# Patient Record
Sex: FEMALE | Race: WHITE | NOT HISPANIC OR LATINO | Employment: FULL TIME | URBAN - METROPOLITAN AREA
[De-identification: names, ages, dates, MRNs, and addresses within clinical notes are randomized per-mention and may not be internally consistent; named-entity substitution may affect disease eponyms.]

---

## 2023-10-25 ENCOUNTER — APPOINTMENT (OUTPATIENT)
Dept: PRIMARY CARE | Facility: CLINIC | Age: 28
End: 2023-10-25
Payer: COMMERCIAL

## 2023-12-05 ENCOUNTER — OFFICE VISIT (OUTPATIENT)
Dept: SURGERY | Facility: CLINIC | Age: 28
End: 2023-12-05
Payer: COMMERCIAL

## 2023-12-05 VITALS
BODY MASS INDEX: 22.56 KG/M2 | HEIGHT: 62 IN | SYSTOLIC BLOOD PRESSURE: 133 MMHG | WEIGHT: 122.6 LBS | HEART RATE: 67 BPM | DIASTOLIC BLOOD PRESSURE: 87 MMHG | OXYGEN SATURATION: 99 %

## 2023-12-05 DIAGNOSIS — D24.2 FIBROADENOMA OF LEFT BREAST: Primary | ICD-10-CM

## 2023-12-05 PROCEDURE — 99213 OFFICE O/P EST LOW 20 MIN: CPT | Performed by: SURGERY

## 2023-12-05 PROCEDURE — 1036F TOBACCO NON-USER: CPT | Performed by: SURGERY

## 2023-12-05 RX ORDER — SODIUM FLUORIDE1.1%, POTASSIUM NITRATE 5% 5.8; 57.5 MG/ML; MG/ML
GEL, DENTIFRICE DENTAL
COMMUNITY
Start: 2023-10-18

## 2023-12-05 RX ORDER — NORGESTIMATE AND ETHINYL ESTRADIOL 0.25-0.035
1 KIT ORAL DAILY
COMMUNITY
Start: 2023-07-17 | End: 2023-12-28

## 2023-12-05 NOTE — PATIENT INSTRUCTIONS
1.  3 for removal of the 2 left breast lumps is scheduled for Monday, 12/18/2023.  Please, read the patient preoperative instruction sheet BEFORE your surgery.  Outpatient surgery will not require a Covid test. However, if you will be admitted to the hospital after your surgery, you will need a Covid test within 72 hours of your procedure.  2.  The ultrasound of the breast and follow-up appointment in the office in 3 months will be canceled since her masses will be removed at the time of surgery.

## 2023-12-05 NOTE — PROGRESS NOTES
GENERAL SURGERY OFFICE NOTE    Patient: Princess Gutierrez    Age: 28 y.o.   Gender: female    MRN: [id number]    PCP: No primary care provider on file.        SUBJECTIVE     Chief Complaint  Follow-up (Patient is here for a left breast follow up. Patient states that she has a newer lump in the left breast that has gotten bigger. Patient states that she had some tenderness and redness in the left armpit that lasted 2-3 days. 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 In the last 6 months, she does not feel that the fibroadenoma has significantly changed. She has some mild discomfort, but does not have any significant pain associated with the fibroadenomas.  She does feel that the smaller of the fibroadenomas has become larger and easier to palpate.  She is ready to consider surgical removal of both of these left breast masses.    Risk factors for breast cancer: 27-year-old premenopausal white female; menarche at age 13; no children; had a right core needle biopsy at age 14 with benign diagnosis; no first-degree relative with breast cancer. She has a maternal grandmother who had breast cancer in her late 30s. She had a paternal uncle who had prostate cancer. She did take birth control pills for about 14 years. Alanis score not applicable due to age.     ROS  Review of Systems   Constitutional: no fever, no chills, no recent weight gain and no recent weight loss.   Eyes: no eye symptoms, but no loss of vision, no discharge from the eyes, no itching of the eyes and no eye pain.   ENT: no hearing loss, no neck pain and no hoarseness.   Cardiovascular: no chest pain, no palpitations and no lower extremity edema.   Respiratory: no dyspnea, no dyspnea during exertion and no cough.   Breast: breast mass and pain in breast, but no nipple discharge, no erythema, no change in breast skin and no axillary adenopathy.   Gastrointestinal: no abdominal pain, no constipation, no heartburn, no  "vomiting, no blood in stools, bowel movement frequency normal.   Genitourinary: no dysuria and no hematuria.   Musculoskeletal: no arthralgias, no myalgias and no hernia.   Integumentary: no rashes and no skin lesions.   Neurological: no headache, no dizziness, no numbness, no tingling and no limb weakness.   Psychiatric: no anxiety, no depression and no emotional problems.   Endocrine: no heat or cold intolerance and no increased thirst.   Hematologic/Lymphatic: no tendency for easy bleeding and no tendency for easy bruising.   All other systems have been reviewed and are negative for complaint.     HISTORY   History reviewed. No pertinent past medical history.     Past Surgical History:   Procedure Laterality Date    BREAST BIOPSY          No Known Allergies     Social History     Tobacco Use   Smoking Status Never   Smokeless Tobacco Never        Social History     Substance and Sexual Activity   Alcohol Use Yes    Alcohol/week: 1.0 standard drink of alcohol    Types: 1 Glasses of wine per week        HOME MEDICATIONS  Current Outpatient Medications   Medication Instructions    Evangelina 0.25-35 mg-mcg tablet 1 tablet, oral, Daily    sodium fluoride-pot nitrate 1.1-5 % paste USE TWICE A DAY MORNING AND EVENING.          OBJECTIVE   Last Recorded Vitals.  Blood pressure 133/87, pulse 67, height 1.575 m (5' 2\"), weight 55.6 kg (122 lb 9.6 oz), SpO2 99 %.     PHYSICAL EXAM  Physical Exam   General: Well-developed, well-nourished and in no acute distress.  Head: Normocephalic. Atraumatic.  Neck/thyroid: Neck is supple.   Eyes: Pupils equal round and reactive to light. Conjunctiva normal.  ENMT: No masses or deformity of external nose. External ears without masses.  Respiratory/Chest: Normal respiratory effort.  Breast: Small, symmetrical breasts. No palpable abnormalities of the right breast. In the left breast, there is an area measuring 4.5 x 3 cm in the deep breast tissue which is firmer than the surrounding tissue. " This is in the periareolar and superior quadrants. No erythema of the skin. No erythema. Just below and attached to the left nipple there is a skin abnormality that measures less than one by one 1 cm which is nontender and not erythematous. The previous 1.7 cm mass at the 12 o'clock position approximately 3 cm from the nipple is more easier to palpate and distinguish from the surrounding fibrocystic tissue than previously.  No expressible fluid or overlying erythema.  Lymphatics: No palpable lymphadenopathy of the cervical, supraclavicular or axillary regions.  Cardiovascular: Regular rate and rhythm.   Abdomen: Soft, nontender, nondistended.   Musculoskeletal: Joints and limbs are grossly normal. Normal gait. Normal range of motion of major joints.  Neuro: Oriented to person, place and time. No obvious neurological deficit. Motor strength grossly normal.  Psych: Normal mood and affect.     RESULTS   Labs  No results found for this or any previous visit (from the past 24 hour(s)).    Radiology Resutls  BREAST ULTRASOUND; US ELASTROGRAPHY FIRST TARGET LESION; US  ELASTROGRAPHY EA ADD TARGET LESION;  9/5/2023 8:55 am     ACCESSION NUMBER(S):  [id number]; [id number]; [id number]     ORDERING CLINICIAN:  CARA WALTON     INDICATION:  Follow-up palpable abnormality left breast .     COMPARISON:  Ultrasound examination 02/24/2023     FINDINGS:  ULTRASOUND: Targeted ultrasound was performed of the left breast by a  registered sonographer with elastography. Heterogeneous breast tissue  present left breast 10 o'clock position 2 cm from nipple with a  HydroMARK reflecting the prior site of biopsy. This area appears  similar to prior examination. This was biopsy proven fibroadenoma.     Adjacent to the biopsy site is a solid 17 mm lobulated mass 12  o'clock position 3 cm from nipple likely reflecting a fibroadenoma  albeit not identified on prior ultrasound examination. Continued  surveillance is recommended. If there is any  future change in size or  clinical symptoms, biopsy is warranted.     IMPRESSION:  Stable heterogeneous breast tissue at the biopsy site. Newly  visualized mass of the right breast having sonographic  characteristics favoring a fibroadenoma particularly in this patient  with prior biopsy proven fibroadenoma. Continued surveillance is  recommended.     BI-RADS CATEGORY:     BI-RADS category 3 - Probably Benign.     Recommendation: Six-month follow-up left breast with ultrasound  beginning date 09/05/2023      ASSESSMENT / PLAN       Plan  Feb 2023: LEFT: 4.6cm biopsy-proven fibroadenoma at the 10 o'clock position; second 1.7 cm left breast mass at the 12 o'clock position    1. With her dense breast tissue, it was discussed the importance of the self breast examination.  2. She has a biopsy-proven fibroadenoma measuring 4.6 cm in the left breast. Follow-up ultrasound shows this to be stable at 4.5 cm.  She has elected to proceed with surgical excision.  Can make a periareolar incision to remove this mass.  We discussed risk of breast size discrepancy postoperatively which she is well aware of.  3. She has a left breast mass measuring 1.7 cm which is consistent with the biopsy-proven fibroadenoma. This has benign features.  She feels that this mass is growing, and certainly is easier to palpate on today's physical exam.  She wishes to have this mass removed at the time of her surgery, and understands that this may be a separate incision from the periareolar incision.  4.  We will cancel the follow-up ultrasound in 2 months since both masses will be removed at the time of surgery.  5.  Risk included, but not limited to, infection, bleeding, injury to surrounding tissue, possible need for other procedure, nonresolution of all symptoms, recurrence of the masses, possible need for further surgery, allergic reaction to medication and even death.  Will plan to proceed with surgery on 12/18/2023.      Rosemarie Vidal MD,  [facility] General Surgery  [address] [place] [address];   [address]  [place]  [address] [phone]

## 2023-12-13 ENCOUNTER — ANESTHESIA EVENT (OUTPATIENT)
Dept: OPERATING ROOM | Facility: HOSPITAL | Age: 28
End: 2023-12-13
Payer: COMMERCIAL

## 2023-12-15 ENCOUNTER — PREP FOR PROCEDURE (OUTPATIENT)
Dept: EMERGENCY MEDICINE | Facility: HOSPITAL | Age: 28
End: 2023-12-15
Payer: COMMERCIAL

## 2023-12-15 RX ORDER — CEFAZOLIN SODIUM 2 G/100ML
2 INJECTION, SOLUTION INTRAVENOUS ONCE
Status: CANCELLED | OUTPATIENT
Start: 2023-12-15 | End: 2023-12-15

## 2023-12-15 NOTE — H&P (VIEW-ONLY)
History Of Present Illness  Princess Gutierrez is a 28 y.o. female presenting for excision of left breast mass x 2.  In the last 6 months, she does not feel that the fibroadenoma has significantly changed. She has some mild discomfort, but does not have any significant pain associated with the fibroadenomas.  She does feel that the smaller of the fibroadenomas has become larger and easier to palpate.  She is ready to consider surgical removal of both of these left breast masses.     Risk factors for breast cancer: 27-year-old premenopausal white female; menarche at age 13; no children; had a right core needle biopsy at age 14 with benign diagnosis; no first-degree relative with breast cancer. She has a maternal grandmother who had breast cancer in her late 30s. She had a paternal uncle who had prostate cancer. She did take birth control pills for about 14 years. Alanis score not applicable due to age.      Past Medical History  No past medical history on file.    Surgical History  Past Surgical History:   Procedure Laterality Date    BREAST BIOPSY          Social History  She reports that she has never smoked. She has never used smokeless tobacco. She reports current alcohol use of about 1.0 standard drink of alcohol per week. She reports that she does not use drugs.    Family History  No family history on file.     Allergies  Patient has no known allergies.    Review of Systems   Constitutional: no fever, no chills, no recent weight gain and no recent weight loss.   Eyes: no eye symptoms, but no loss of vision, no discharge from the eyes, no itching of the eyes and no eye pain.   ENT: no hearing loss, no neck pain and no hoarseness.   Cardiovascular: no chest pain, no palpitations and no lower extremity edema.   Respiratory: no dyspnea, no dyspnea during exertion and no cough.   Breast: breast mass and pain in breast, but no nipple discharge, no erythema, no change in breast skin and no axillary adenopathy.    Gastrointestinal: no abdominal pain, no constipation, no heartburn, no vomiting, no blood in stools, bowel movement frequency normal.   Genitourinary: no dysuria and no hematuria.   Musculoskeletal: no arthralgias, no myalgias and no hernia.   Integumentary: no rashes and no skin lesions.   Neurological: no headache, no dizziness, no numbness, no tingling and no limb weakness.   Psychiatric: no anxiety, no depression and no emotional problems.   Endocrine: no heat or cold intolerance and no increased thirst.   Hematologic/Lymphatic: no tendency for easy bleeding and no tendency for easy bruising.   All other systems have been reviewed and are negative for complaint.     Physical Exam   General: Well-developed, well-nourished and in no acute distress.  Head: Normocephalic. Atraumatic.  Neck/thyroid: Neck is supple.   Eyes: Pupils equal round and reactive to light. Conjunctiva normal.  ENMT: No masses or deformity of external nose. External ears without masses.  Respiratory/Chest: Normal respiratory effort.  Breast: Small, symmetrical breasts. No palpable abnormalities of the right breast. In the left breast, there is an area measuring 4.5 x 3 cm in the deep breast tissue which is firmer than the surrounding tissue. This is in the periareolar and superior quadrants. No erythema of the skin. No erythema. Just below and attached to the left nipple there is a skin abnormality that measures less than one by one 1 cm which is nontender and not erythematous. The previous 1.7 cm mass at the 12 o'clock position approximately 3 cm from the nipple is more easier to palpate and distinguish from the surrounding fibrocystic tissue than previously.  No expressible fluid or overlying erythema.  Lymphatics: No palpable lymphadenopathy of the cervical, supraclavicular or axillary regions.  Cardiovascular: Regular rate and rhythm.   Abdomen: Soft, nontender, nondistended.   Musculoskeletal: Joints and limbs are grossly normal. Normal  gait. Normal range of motion of major joints.  Neuro: Oriented to person, place and time. No obvious neurological deficit. Motor strength grossly normal.  Psych: Normal mood and affect.     Last Recorded Vitals  There were no vitals taken for this visit.       RESULTS   Labs  No results found for this or any previous visit (from the past 24 hour(s)).     Radiology Resutls  BREAST ULTRASOUND; US ELASTROGRAPHY FIRST TARGET LESION; US  ELASTROGRAPHY EA ADD TARGET LESION;  9/5/2023 8:55 am     ACCESSION NUMBER(S):  [id number]; [id number]; [id number]     ORDERING CLINICIAN:  CARA WALTON     INDICATION:  Follow-up palpable abnormality left breast .     COMPARISON:  Ultrasound examination 02/24/2023     FINDINGS:  ULTRASOUND: Targeted ultrasound was performed of the left breast by a  registered sonographer with elastography. Heterogeneous breast tissue  present left breast 10 o'clock position 2 cm from nipple with a  HydroMARK reflecting the prior site of biopsy. This area appears  similar to prior examination. This was biopsy proven fibroadenoma.     Adjacent to the biopsy site is a solid 17 mm lobulated mass 12  o'clock position 3 cm from nipple likely reflecting a fibroadenoma  albeit not identified on prior ultrasound examination. Continued  surveillance is recommended. If there is any future change in size or  clinical symptoms, biopsy is warranted.     IMPRESSION:  Stable heterogeneous breast tissue at the biopsy site. Newly  visualized mass of the right breast having sonographic  characteristics favoring a fibroadenoma particularly in this patient  with prior biopsy proven fibroadenoma. Continued surveillance is  recommended.     BI-RADS CATEGORY:     BI-RADS category 3 - Probably Benign.     Recommendation: Six-month follow-up left breast with ultrasound  beginning date 09/05/2023     Assessment/Plan   There are no diagnoses linked to this encounter.  Feb 2023: LEFT: 4.6cm biopsy-proven fibroadenoma at the 10  o'clock position; second 1.7 cm left breast mass at the 12 o'clock position     1. With her dense breast tissue, it was discussed the importance of the self breast examination.  2. She has a biopsy-proven fibroadenoma measuring 4.6 cm in the left breast. Follow-up ultrasound shows this to be stable at 4.5 cm.  She has elected to proceed with surgical excision.  Can make a periareolar incision to remove this mass.  We discussed risk of breast size discrepancy postoperatively which she is well aware of.  3. She has a left breast mass measuring 1.7 cm which is consistent with the biopsy-proven fibroadenoma. This has benign features.  She feels that this mass is growing, and certainly is easier to palpate on today's physical exam.  She wishes to have this mass removed at the time of her surgery, and understands that this may be a separate incision from the periareolar incision.  4.  We will cancel the follow-up ultrasound in 2 months since both masses will be removed at the time of surgery.  5.  Risk included, but not limited to, infection, bleeding, injury to surrounding tissue, possible need for other procedure, nonresolution of all symptoms, recurrence of the masses, possible need for further surgery, allergic reaction to medication and even death.  Will plan to proceed with surgery on 12/18/2023.      Rosemarie Vidal MD

## 2023-12-15 NOTE — H&P
History Of Present Illness  Princess Gutierrez is a 28 y.o. female presenting for excision of left breast mass x 2.  In the last 6 months, she does not feel that the fibroadenoma has significantly changed. She has some mild discomfort, but does not have any significant pain associated with the fibroadenomas.  She does feel that the smaller of the fibroadenomas has become larger and easier to palpate.  She is ready to consider surgical removal of both of these left breast masses.     Risk factors for breast cancer: 27-year-old premenopausal white female; menarche at age 13; no children; had a right core needle biopsy at age 14 with benign diagnosis; no first-degree relative with breast cancer. She has a maternal grandmother who had breast cancer in her late 30s. She had a paternal uncle who had prostate cancer. She did take birth control pills for about 14 years. Alanis score not applicable due to age.      Past Medical History  No past medical history on file.    Surgical History  Past Surgical History:   Procedure Laterality Date    BREAST BIOPSY          Social History  She reports that she has never smoked. She has never used smokeless tobacco. She reports current alcohol use of about 1.0 standard drink of alcohol per week. She reports that she does not use drugs.    Family History  No family history on file.     Allergies  Patient has no known allergies.    Review of Systems   Constitutional: no fever, no chills, no recent weight gain and no recent weight loss.   Eyes: no eye symptoms, but no loss of vision, no discharge from the eyes, no itching of the eyes and no eye pain.   ENT: no hearing loss, no neck pain and no hoarseness.   Cardiovascular: no chest pain, no palpitations and no lower extremity edema.   Respiratory: no dyspnea, no dyspnea during exertion and no cough.   Breast: breast mass and pain in breast, but no nipple discharge, no erythema, no change in breast skin and no axillary adenopathy.    Gastrointestinal: no abdominal pain, no constipation, no heartburn, no vomiting, no blood in stools, bowel movement frequency normal.   Genitourinary: no dysuria and no hematuria.   Musculoskeletal: no arthralgias, no myalgias and no hernia.   Integumentary: no rashes and no skin lesions.   Neurological: no headache, no dizziness, no numbness, no tingling and no limb weakness.   Psychiatric: no anxiety, no depression and no emotional problems.   Endocrine: no heat or cold intolerance and no increased thirst.   Hematologic/Lymphatic: no tendency for easy bleeding and no tendency for easy bruising.   All other systems have been reviewed and are negative for complaint.     Physical Exam   General: Well-developed, well-nourished and in no acute distress.  Head: Normocephalic. Atraumatic.  Neck/thyroid: Neck is supple.   Eyes: Pupils equal round and reactive to light. Conjunctiva normal.  ENMT: No masses or deformity of external nose. External ears without masses.  Respiratory/Chest: Normal respiratory effort.  Breast: Small, symmetrical breasts. No palpable abnormalities of the right breast. In the left breast, there is an area measuring 4.5 x 3 cm in the deep breast tissue which is firmer than the surrounding tissue. This is in the periareolar and superior quadrants. No erythema of the skin. No erythema. Just below and attached to the left nipple there is a skin abnormality that measures less than one by one 1 cm which is nontender and not erythematous. The previous 1.7 cm mass at the 12 o'clock position approximately 3 cm from the nipple is more easier to palpate and distinguish from the surrounding fibrocystic tissue than previously.  No expressible fluid or overlying erythema.  Lymphatics: No palpable lymphadenopathy of the cervical, supraclavicular or axillary regions.  Cardiovascular: Regular rate and rhythm.   Abdomen: Soft, nontender, nondistended.   Musculoskeletal: Joints and limbs are grossly normal. Normal  gait. Normal range of motion of major joints.  Neuro: Oriented to person, place and time. No obvious neurological deficit. Motor strength grossly normal.  Psych: Normal mood and affect.     Last Recorded Vitals  There were no vitals taken for this visit.       RESULTS   Labs  No results found for this or any previous visit (from the past 24 hour(s)).     Radiology Resutls  BREAST ULTRASOUND; US ELASTROGRAPHY FIRST TARGET LESION; US  ELASTROGRAPHY EA ADD TARGET LESION;  9/5/2023 8:55 am     ACCESSION NUMBER(S):  [id number]; [id number]; [id number]     ORDERING CLINICIAN:  CARA WALTON     INDICATION:  Follow-up palpable abnormality left breast .     COMPARISON:  Ultrasound examination 02/24/2023     FINDINGS:  ULTRASOUND: Targeted ultrasound was performed of the left breast by a  registered sonographer with elastography. Heterogeneous breast tissue  present left breast 10 o'clock position 2 cm from nipple with a  HydroMARK reflecting the prior site of biopsy. This area appears  similar to prior examination. This was biopsy proven fibroadenoma.     Adjacent to the biopsy site is a solid 17 mm lobulated mass 12  o'clock position 3 cm from nipple likely reflecting a fibroadenoma  albeit not identified on prior ultrasound examination. Continued  surveillance is recommended. If there is any future change in size or  clinical symptoms, biopsy is warranted.     IMPRESSION:  Stable heterogeneous breast tissue at the biopsy site. Newly  visualized mass of the right breast having sonographic  characteristics favoring a fibroadenoma particularly in this patient  with prior biopsy proven fibroadenoma. Continued surveillance is  recommended.     BI-RADS CATEGORY:     BI-RADS category 3 - Probably Benign.     Recommendation: Six-month follow-up left breast with ultrasound  beginning date 09/05/2023     Assessment/Plan   There are no diagnoses linked to this encounter.  Feb 2023: LEFT: 4.6cm biopsy-proven fibroadenoma at the 10  o'clock position; second 1.7 cm left breast mass at the 12 o'clock position     1. With her dense breast tissue, it was discussed the importance of the self breast examination.  2. She has a biopsy-proven fibroadenoma measuring 4.6 cm in the left breast. Follow-up ultrasound shows this to be stable at 4.5 cm.  She has elected to proceed with surgical excision.  Can make a periareolar incision to remove this mass.  We discussed risk of breast size discrepancy postoperatively which she is well aware of.  3. She has a left breast mass measuring 1.7 cm which is consistent with the biopsy-proven fibroadenoma. This has benign features.  She feels that this mass is growing, and certainly is easier to palpate on today's physical exam.  She wishes to have this mass removed at the time of her surgery, and understands that this may be a separate incision from the periareolar incision.  4.  We will cancel the follow-up ultrasound in 2 months since both masses will be removed at the time of surgery.  5.  Risk included, but not limited to, infection, bleeding, injury to surrounding tissue, possible need for other procedure, nonresolution of all symptoms, recurrence of the masses, possible need for further surgery, allergic reaction to medication and even death.  Will plan to proceed with surgery on 12/18/2023.      Rosemarie Vidal MD

## 2023-12-18 ENCOUNTER — ANESTHESIA (OUTPATIENT)
Dept: OPERATING ROOM | Facility: HOSPITAL | Age: 28
End: 2023-12-18
Payer: COMMERCIAL

## 2023-12-18 ENCOUNTER — HOSPITAL ENCOUNTER (OUTPATIENT)
Facility: HOSPITAL | Age: 28
Setting detail: OUTPATIENT SURGERY
Discharge: HOME | End: 2023-12-18
Attending: SURGERY | Admitting: SURGERY
Payer: COMMERCIAL

## 2023-12-18 ENCOUNTER — PHARMACY VISIT (OUTPATIENT)
Dept: PHARMACY | Facility: CLINIC | Age: 28
End: 2023-12-18
Payer: COMMERCIAL

## 2023-12-18 VITALS
DIASTOLIC BLOOD PRESSURE: 89 MMHG | HEART RATE: 72 BPM | OXYGEN SATURATION: 100 % | HEIGHT: 62 IN | BODY MASS INDEX: 22.45 KG/M2 | TEMPERATURE: 98.1 F | SYSTOLIC BLOOD PRESSURE: 126 MMHG | RESPIRATION RATE: 18 BRPM | WEIGHT: 122 LBS

## 2023-12-18 DIAGNOSIS — D24.2 FIBROADENOMA OF LEFT BREAST: Primary | ICD-10-CM

## 2023-12-18 DIAGNOSIS — D24.2 BENIGN NEOPLASM OF LEFT BREAST: ICD-10-CM

## 2023-12-18 PROCEDURE — 2720000007 HC OR 272 NO HCPCS: Performed by: SURGERY

## 2023-12-18 PROCEDURE — 7100000010 HC PHASE TWO TIME - EACH INCREMENTAL 1 MINUTE: Performed by: SURGERY

## 2023-12-18 PROCEDURE — 2500000004 HC RX 250 GENERAL PHARMACY W/ HCPCS (ALT 636 FOR OP/ED): Performed by: LICENSED PRACTICAL NURSE

## 2023-12-18 PROCEDURE — 2500000005 HC RX 250 GENERAL PHARMACY W/O HCPCS: Performed by: LICENSED PRACTICAL NURSE

## 2023-12-18 PROCEDURE — 7100000002 HC RECOVERY ROOM TIME - EACH INCREMENTAL 1 MINUTE: Performed by: SURGERY

## 2023-12-18 PROCEDURE — 3600000003 HC OR TIME - INITIAL BASE CHARGE - PROCEDURE LEVEL THREE: Performed by: SURGERY

## 2023-12-18 PROCEDURE — 2500000004 HC RX 250 GENERAL PHARMACY W/ HCPCS (ALT 636 FOR OP/ED): Performed by: ANESTHESIOLOGY

## 2023-12-18 PROCEDURE — 88305 TISSUE EXAM BY PATHOLOGIST: CPT | Performed by: PATHOLOGY

## 2023-12-18 PROCEDURE — 88305 TISSUE EXAM BY PATHOLOGIST: CPT | Mod: TC,SUR,PORLAB | Performed by: SURGERY

## 2023-12-18 PROCEDURE — 3600000008 HC OR TIME - EACH INCREMENTAL 1 MINUTE - PROCEDURE LEVEL THREE: Performed by: SURGERY

## 2023-12-18 PROCEDURE — 3700000002 HC GENERAL ANESTHESIA TIME - EACH INCREMENTAL 1 MINUTE: Performed by: SURGERY

## 2023-12-18 PROCEDURE — RXMED WILLOW AMBULATORY MEDICATION CHARGE

## 2023-12-18 PROCEDURE — 3700000001 HC GENERAL ANESTHESIA TIME - INITIAL BASE CHARGE: Performed by: SURGERY

## 2023-12-18 PROCEDURE — 7100000009 HC PHASE TWO TIME - INITIAL BASE CHARGE: Performed by: SURGERY

## 2023-12-18 PROCEDURE — 2500000005 HC RX 250 GENERAL PHARMACY W/O HCPCS: Performed by: SURGERY

## 2023-12-18 PROCEDURE — 7100000001 HC RECOVERY ROOM TIME - INITIAL BASE CHARGE: Performed by: SURGERY

## 2023-12-18 PROCEDURE — 2500000004 HC RX 250 GENERAL PHARMACY W/ HCPCS (ALT 636 FOR OP/ED): Performed by: SURGERY

## 2023-12-18 RX ORDER — HYDRALAZINE HYDROCHLORIDE 20 MG/ML
5 INJECTION INTRAMUSCULAR; INTRAVENOUS EVERY 30 MIN PRN
Status: DISCONTINUED | OUTPATIENT
Start: 2023-12-18 | End: 2023-12-18 | Stop reason: HOSPADM

## 2023-12-18 RX ORDER — SODIUM CHLORIDE, SODIUM LACTATE, POTASSIUM CHLORIDE, CALCIUM CHLORIDE 600; 310; 30; 20 MG/100ML; MG/100ML; MG/100ML; MG/100ML
100 INJECTION, SOLUTION INTRAVENOUS CONTINUOUS
Status: DISCONTINUED | OUTPATIENT
Start: 2023-12-18 | End: 2023-12-18 | Stop reason: HOSPADM

## 2023-12-18 RX ORDER — BUPIVACAINE HCL/EPINEPHRINE 0.5-1:200K
VIAL (ML) INJECTION AS NEEDED
Status: DISCONTINUED | OUTPATIENT
Start: 2023-12-18 | End: 2023-12-18 | Stop reason: HOSPADM

## 2023-12-18 RX ORDER — OXYCODONE AND ACETAMINOPHEN 5; 325 MG/1; MG/1
1 TABLET ORAL EVERY 4 HOURS PRN
Status: DISCONTINUED | OUTPATIENT
Start: 2023-12-18 | End: 2023-12-18 | Stop reason: HOSPADM

## 2023-12-18 RX ORDER — LIDOCAINE HYDROCHLORIDE 10 MG/ML
0.1 INJECTION, SOLUTION EPIDURAL; INFILTRATION; INTRACAUDAL; PERINEURAL ONCE
Status: DISCONTINUED | OUTPATIENT
Start: 2023-12-18 | End: 2023-12-18 | Stop reason: HOSPADM

## 2023-12-18 RX ORDER — FAMOTIDINE 10 MG/ML
20 INJECTION INTRAVENOUS ONCE
Status: COMPLETED | OUTPATIENT
Start: 2023-12-18 | End: 2023-12-18

## 2023-12-18 RX ORDER — LIDOCAINE HCL/PF 100 MG/5ML
SYRINGE (ML) INTRAVENOUS AS NEEDED
Status: DISCONTINUED | OUTPATIENT
Start: 2023-12-18 | End: 2023-12-18

## 2023-12-18 RX ORDER — MORPHINE SULFATE 2 MG/ML
2 INJECTION, SOLUTION INTRAMUSCULAR; INTRAVENOUS EVERY 5 MIN PRN
Status: DISCONTINUED | OUTPATIENT
Start: 2023-12-18 | End: 2023-12-18 | Stop reason: HOSPADM

## 2023-12-18 RX ORDER — ONDANSETRON HYDROCHLORIDE 2 MG/ML
4 INJECTION, SOLUTION INTRAVENOUS ONCE AS NEEDED
Status: DISCONTINUED | OUTPATIENT
Start: 2023-12-18 | End: 2023-12-18 | Stop reason: HOSPADM

## 2023-12-18 RX ORDER — ALBUTEROL SULFATE 0.83 MG/ML
2.5 SOLUTION RESPIRATORY (INHALATION) ONCE AS NEEDED
Status: DISCONTINUED | OUTPATIENT
Start: 2023-12-18 | End: 2023-12-18 | Stop reason: HOSPADM

## 2023-12-18 RX ORDER — MIDAZOLAM HYDROCHLORIDE 1 MG/ML
INJECTION, SOLUTION INTRAMUSCULAR; INTRAVENOUS AS NEEDED
Status: DISCONTINUED | OUTPATIENT
Start: 2023-12-18 | End: 2023-12-18

## 2023-12-18 RX ORDER — FENTANYL CITRATE 50 UG/ML
INJECTION, SOLUTION INTRAMUSCULAR; INTRAVENOUS AS NEEDED
Status: DISCONTINUED | OUTPATIENT
Start: 2023-12-18 | End: 2023-12-18

## 2023-12-18 RX ORDER — PROPOFOL 10 MG/ML
INJECTION, EMULSION INTRAVENOUS AS NEEDED
Status: DISCONTINUED | OUTPATIENT
Start: 2023-12-18 | End: 2023-12-18

## 2023-12-18 RX ORDER — HYDROCODONE BITARTRATE AND ACETAMINOPHEN 7.5; 325 MG/1; MG/1
1 TABLET ORAL EVERY 6 HOURS PRN
Qty: 10 TABLET | Refills: 0 | Status: SHIPPED | OUTPATIENT
Start: 2023-12-18 | End: 2023-12-25

## 2023-12-18 RX ORDER — LABETALOL HYDROCHLORIDE 5 MG/ML
5 INJECTION, SOLUTION INTRAVENOUS ONCE AS NEEDED
Status: DISCONTINUED | OUTPATIENT
Start: 2023-12-18 | End: 2023-12-18 | Stop reason: HOSPADM

## 2023-12-18 RX ORDER — DROPERIDOL 2.5 MG/ML
0.62 INJECTION, SOLUTION INTRAMUSCULAR; INTRAVENOUS ONCE AS NEEDED
Status: DISCONTINUED | OUTPATIENT
Start: 2023-12-18 | End: 2023-12-18 | Stop reason: HOSPADM

## 2023-12-18 RX ORDER — DIPHENHYDRAMINE HYDROCHLORIDE 50 MG/ML
12.5 INJECTION INTRAMUSCULAR; INTRAVENOUS ONCE AS NEEDED
Status: DISCONTINUED | OUTPATIENT
Start: 2023-12-18 | End: 2023-12-18 | Stop reason: HOSPADM

## 2023-12-18 RX ORDER — DEXAMETHASONE SODIUM PHOSPHATE 4 MG/ML
INJECTION, SOLUTION INTRA-ARTICULAR; INTRALESIONAL; INTRAMUSCULAR; INTRAVENOUS; SOFT TISSUE AS NEEDED
Status: DISCONTINUED | OUTPATIENT
Start: 2023-12-18 | End: 2023-12-18

## 2023-12-18 RX ORDER — CEFAZOLIN SODIUM 2 G/100ML
2 INJECTION, SOLUTION INTRAVENOUS ONCE
Status: COMPLETED | OUTPATIENT
Start: 2023-12-18 | End: 2023-12-18

## 2023-12-18 RX ADMIN — CEFAZOLIN SODIUM 2 G: 2 INJECTION, SOLUTION INTRAVENOUS at 08:54

## 2023-12-18 RX ADMIN — FAMOTIDINE 20 MG: 10 INJECTION, SOLUTION INTRAVENOUS at 08:00

## 2023-12-18 RX ADMIN — PROPOFOL 200 MG: 10 INJECTION, EMULSION INTRAVENOUS at 09:00

## 2023-12-18 RX ADMIN — MIDAZOLAM HYDROCHLORIDE 2 MG: 1 INJECTION, SOLUTION INTRAMUSCULAR; INTRAVENOUS at 08:54

## 2023-12-18 RX ADMIN — FENTANYL CITRATE 50 MCG: 50 INJECTION, SOLUTION INTRAMUSCULAR; INTRAVENOUS at 09:00

## 2023-12-18 RX ADMIN — DEXAMETHASONE SODIUM PHOSPHATE 8 MG: 4 INJECTION INTRA-ARTICULAR; INTRALESIONAL; INTRAMUSCULAR; INTRAVENOUS; SOFT TISSUE at 09:03

## 2023-12-18 RX ADMIN — LIDOCAINE HYDROCHLORIDE 50 MG: 20 INJECTION INTRAVENOUS at 09:00

## 2023-12-18 RX ADMIN — SODIUM CHLORIDE, POTASSIUM CHLORIDE, SODIUM LACTATE AND CALCIUM CHLORIDE 100 ML/HR: 600; 310; 30; 20 INJECTION, SOLUTION INTRAVENOUS at 08:00

## 2023-12-18 SDOH — HEALTH STABILITY: MENTAL HEALTH: CURRENT SMOKER: 0

## 2023-12-18 ASSESSMENT — PAIN - FUNCTIONAL ASSESSMENT
PAIN_FUNCTIONAL_ASSESSMENT: 0-10
PAIN_FUNCTIONAL_ASSESSMENT: 0-10

## 2023-12-18 ASSESSMENT — PAIN SCALES - GENERAL
PAIN_LEVEL: 0
PAINLEVEL_OUTOF10: 0 - NO PAIN

## 2023-12-18 ASSESSMENT — COLUMBIA-SUICIDE SEVERITY RATING SCALE - C-SSRS
6. HAVE YOU EVER DONE ANYTHING, STARTED TO DO ANYTHING, OR PREPARED TO DO ANYTHING TO END YOUR LIFE?: NO
1. IN THE PAST MONTH, HAVE YOU WISHED YOU WERE DEAD OR WISHED YOU COULD GO TO SLEEP AND NOT WAKE UP?: NO

## 2023-12-18 NOTE — OP NOTE
Left breast lumpectomy x2 (L) Operative Note     Date: 2023  OR Location: [facility]    Name: Princess Gutierrez, : 1995, Age: 28 y.o., MRN: [id number], Sex: female    Diagnosis  * No Diagnosis Codes entered * * No Diagnosis Codes entered *     Procedures  Left breast lumpectomy x2  42615 - MT EXC CYST/ABERRANT BREAST TISSUE OPEN 1/> LESION      Surgeons      * Rosemarie Vidal - Primary    Resident/Fellow/Other Assistant:  Surgeon(s) and Role:    Procedure Summary  Anesthesia: General  ASA: I  Anesthesia Staff: CRNA: EMANI Delatorre-CRNA  Estimated Blood Loss: 5mL  Intra-op Medications:   Medication Name Total Dose   BUPivacaine-EPINEPHrine (Marcaine w/EPI) 0.5 %-1:200,000 injection 20 mL   ceFAZolin in dextrose (iso-os) (Ancef) IVPB 2 g 2 g              Anesthesia Record               Intraprocedure I/O Totals       None           Specimen:   ID Type Source Tests Collected by Time   1 : Left retroareolar mass Tissue BREAST LUMPECTOMY LEFT SURGICAL PATHOLOGY EXAM Rosemarie Vidal MD 2023 0940   2 : left upper outer mass Tissue BREAST LUMPECTOMY LEFT SURGICAL PATHOLOGY EXAM Rosemarie Vidal MD 2023 0948        Staff:   Circulator: Perla Arguello RN  Scrub Person: Princess Reeserodrigue         Drains and/or Catheters: * None in log *    Tourniquet Times:         Implants:     Findings: Significant fibrocystic changes of the breast.  Palpable mass x 2    Indications: Princess Gutierrez is an 28 y.o. female who is having surgery for D24.2.  She had presented originally with a large 4 cm mass of the left breast in the retroareolar region.  She had undergone a core needle biopsy which showed a fibroadenoma.  She then presented with a second palpable mass of the upper outer quadrant.  This had similar findings of the previous fibroadenoma.  These were monitored, but she felt like they were growing.  She elected to have them removed.  The benefits and risks of this were  reviewed with the patient preoperatively.  The masses were marked in the preop holding area and confirmed by the patient.    The patient was seen in the preoperative area. The risks, benefits, complications, treatment options, non-operative alternatives, expected recovery and outcomes were discussed with the patient. The possibilities of reaction to medication, pulmonary aspiration, injury to surrounding structures, bleeding, recurrent infection, the need for additional procedures, failure to diagnose a condition, and creating a complication requiring transfusion or operation were discussed with the patient. The patient concurred with the proposed plan, giving informed consent.  The site of surgery was properly noted/marked if necessary per policy. The patient has been actively warmed in preoperative area. Preoperative antibiotics have been ordered and given within 1 hours of incision. Venous thrombosis prophylaxis have been ordered including bilateral sequential compression devices    Procedure Details:   She was brought into the operating room and was laid in the supine position.  After placement under general anesthesia, CORRY hose and SCDs were placed on bilateral lower extremities.  The left breast then was prepped with ChloraPrep and draped in the usual sterile fashion.  A pause was taken the correct patient procedure were identified.    At this time half percent Marcaine with epinephrine was used to locally anesthetize the periareolar region on the superior aspect.  A 4.5 cm curvilinear incision was made just on the edge of the areola on the superior aspect.  This incision was carried down through the breast tissue which demonstrated significant fibrocystic disease.  The area of the palpable fibroadenoma was identified and the mass was grasped with Gregoria clamps.  Using mostly palpation to identify the edge of the fibroadenoma, the fibroadenoma was excised using cautery.  The initial mass measured at least 4 cm  in greatest diameter.  Once this mass was removed, the area of the secondary mass in the upper outer quadrant was identified through the incision.  This was grasped with a Gregoria clamp on the inside and excised in a similar fashion using palpation to guide the edge of the masses.  Once both masses were removed, the cavity was checked for hemostasis.  Was irrigated and there was no evidence of any bleeding.  The superficial breast tissue then was reapproximated using interrupted stitches of 2-0 Vicryl suture.  The skin was reapproximated using a running subcuticular stitch of 4-0 Vicryl suture.  The wound then was dressed with a combination of Steri-Strips, 4 x 4's and tape.  The patient tolerated the procedure well.  She then was awoken from general anesthesia and taken to the recovery room in stable condition.    Counts: Correct x 2  Complications: None.  Drains: None.    Complications:  None; patient tolerated the procedure well.    Disposition: PACU - hemodynamically stable.  Condition: stable         Additional Details:     Attending Attestation: I was present for the entire procedure.    Rosemarie Vidal  Phone Number: [phone]

## 2023-12-18 NOTE — DISCHARGE INSTRUCTIONS
1.  Do not engage in sports, heavy work or lifting until cleared by Dr. Vidal.  Do not lift more than 10 pounds for 2 weeks with left arm.  2.  You may shower starting the day after your surgery.  Remove the gauze/tape dressing before your shower.  Pat dry the Steri-Strips after your shower.  You do NOT need to cover the Steri-Strips after your shower unless there is bleeding or drainage.  3.  Allow the Steri-Strips to fall off on their own.  4.  You may drive when off of narcotic pain medication.  5.  Apply ice to your surgical area for 20 minutes 3 times a day to help with pain and swelling.  6.  You may use Tylenol, or ibuprofen, in addition to or instead of, your narcotic pain medication.  7.  Diet as tolerated.   8.  Wear your postoperative bra or a sports bra for the first 48 hours at all times.  Then for comfort only.  9.  Call Dr. Vidal's office ([phone]) if you experience any of the following:            Any evidence of infection at your surgical site which can include redness and drainage.            Excessive bleeding from your surgical site.  If there is a small amount of bleeding, apply pressure for 20 minutes.  If the bleeding does not stop, please call the office.            Some nausea and vomiting is expected for the first 24 hours after surgery.  If you are unable to keep down fluids, or the nausea/vomiting continues beyond 24 hours.             If you are unable to urinate within 8-12 hours after discharge from the hospital.            A low-grade fever after surgery is normal.  Notify the office if your temperature goes above 101.            Any other concerns or questions you have regarding your surgery.

## 2023-12-18 NOTE — ANESTHESIA PROCEDURE NOTES
Airway  Date/Time: 12/18/2023 9:00 AM  Urgency: elective    Airway not difficult    Staffing  Performed: resident   Authorized by: EMANI Delatorre-INGRID    Performed by: MEHDI Delatorre  Patient location during procedure: OR    Indications and Patient Condition  Indications for airway management: anesthesia  Spontaneous ventilation: present  Sedation level: deep  Preoxygenated: yes  Patient position: sniffing  Mask difficulty assessment: 0 - not attempted  No planned trial extubation    Final Airway Details  Final airway type: supraglottic airway      Successful airway: Supraglottic airway: i-gel.  Size 4     Number of attempts at approach: 1  Ventilation between attempts: BVM  Number of other approaches attempted: 0    Additional Comments  Inserted without difficulty by medical student, atraumatic, dentition intact.

## 2023-12-18 NOTE — ANESTHESIA POSTPROCEDURE EVALUATION
Patient: Princess Gutierrez    Procedure Summary       Date: 12/18/23 Room / Location: [facility] 01 / [facility]    Anesthesia Start: 0855 Anesthesia Stop: 1019    Procedure: Left breast lumpectomy x2 (Left) Diagnosis: (D24.2)    Surgeons: Rosemarie Vidal MD Responsible Provider: MEHDI Delatorre    Anesthesia Type: general ASA Status: 1            Anesthesia Type: general    Vitals Value Taken Time   /87 12/18/23 1018   Temp 36.5 °C (97.7 °F) 12/18/23 1018   Pulse 105 12/18/23 1018   Resp 16 12/18/23 1019   SpO2 100 % 12/18/23 1019   Vitals shown include unvalidated device data.    Anesthesia Post Evaluation    Patient participation: complete - patient participated  Level of consciousness: awake and alert  Pain score: 0  Pain management: adequate  Airway patency: patent  Cardiovascular status: acceptable  Respiratory status: acceptable  Hydration status: acceptable  Postoperative Nausea and Vomiting: none        There were no known notable events for this encounter.

## 2023-12-18 NOTE — ANESTHESIA PREPROCEDURE EVALUATION
Patient: Princess Gutierrez    Procedure Information       Date/Time: 12/18/23 0845    Procedure: Left breast lumpectomy x2 (Left) - 8 AM; 1 hr    Location: [facility] / [facility]    Surgeons: Rosemarie Vidal MD            Relevant Problems   No relevant active problems       Clinical information reviewed:   Tobacco  Allergies  Meds  Problems  Med Hx  Surg Hx  OB Status    Fam Hx  Soc Hx        NPO Detail:  NPO/Void Status  Date of Last Liquid: 12/17/23  Time of Last Liquid: 2000  Date of Last Solid: 12/17/23  Time of Last Solid: 2000         Physical Exam    Airway  Mallampati: I  TM distance: >3 FB     Cardiovascular - normal exam     Dental - normal exam     Pulmonary - normal exam     Abdominal - normal exam             Anesthesia Plan    ASA 1     general     The patient is not a current smoker.    intravenous induction   Postoperative administration of opioids is intended.  Anesthetic plan and risks discussed with patient.  Use of blood products discussed with patient who.    Plan discussed with CRNA.

## 2023-12-25 DIAGNOSIS — N92.1 EXCESSIVE AND FREQUENT MENSTRUATION WITH IRREGULAR CYCLE: ICD-10-CM

## 2023-12-26 LAB
LABORATORY COMMENT REPORT: NORMAL
PATH REPORT.FINAL DX SPEC: NORMAL
PATH REPORT.GROSS SPEC: NORMAL
PATH REPORT.RELEVANT HX SPEC: NORMAL
PATH REPORT.TOTAL CANCER: NORMAL

## 2023-12-27 ENCOUNTER — TELEPHONE (OUTPATIENT)
Dept: OBSTETRICS AND GYNECOLOGY | Facility: CLINIC | Age: 28
End: 2023-12-27
Payer: COMMERCIAL

## 2023-12-28 RX ORDER — NORGESTIMATE AND ETHINYL ESTRADIOL 0.25-0.035
1 KIT ORAL DAILY
Qty: 84 TABLET | Refills: 3 | Status: SHIPPED | OUTPATIENT
Start: 2023-12-28 | End: 2024-04-24 | Stop reason: SDUPTHER

## 2024-01-03 ENCOUNTER — OFFICE VISIT (OUTPATIENT)
Dept: SURGERY | Facility: CLINIC | Age: 29
End: 2024-01-03
Payer: COMMERCIAL

## 2024-01-03 VITALS
DIASTOLIC BLOOD PRESSURE: 69 MMHG | BODY MASS INDEX: 22.41 KG/M2 | OXYGEN SATURATION: 99 % | WEIGHT: 121.8 LBS | SYSTOLIC BLOOD PRESSURE: 105 MMHG | HEIGHT: 62 IN | HEART RATE: 73 BPM

## 2024-01-03 DIAGNOSIS — R92.30 DENSE BREAST TISSUE: ICD-10-CM

## 2024-01-03 DIAGNOSIS — D24.2 FIBROADENOMA OF LEFT BREAST: Primary | ICD-10-CM

## 2024-01-03 DIAGNOSIS — N60.19 FIBROCYSTIC BREAST DISEASE (FCBD), UNSPECIFIED LATERALITY: ICD-10-CM

## 2024-01-03 PROCEDURE — 1036F TOBACCO NON-USER: CPT | Performed by: SURGERY

## 2024-01-03 PROCEDURE — 99024 POSTOP FOLLOW-UP VISIT: CPT | Performed by: SURGERY

## 2024-01-03 NOTE — PATIENT INSTRUCTIONS
1.  You may use lotion on your incision site to help with any dryness or itching.  Using a vitamin E or cocoa butter based lotion is recommended to do a 5-minute massage therapy twice a day to your incision site to help it heal.  2.  Since you have had removal of both of the fibroadenomas, no follow-up x-ray or exam is needed at this time.  You should continue to do your monthly self breast exams, and if you identify any abnormalities, you may contact Dr. Vidal's office for reevaluation.  If the day after your surgery, call the office to make an appointment for 2 weeks.  If you have any questions, please contact our office at [phone].  3.  You should start your yearly screening mammograms at age 40.  You may get these through either your primary care physician or your gynecologist all

## 2024-01-03 NOTE — PROGRESS NOTES
GENERAL SURGERY OFFICE NOTE    Patient: Princess Gutierrez    Age: 28 y.o.   Gender: female    MRN: [id number]    PCP: No Assigned PCP Generic Provider, MD        SUBJECTIVE     Chief Complaint  Follow-up (Patient is here for a 2 week post op left breast lumpectomy. Patient states that she is healing well. )       LUANN Sánchez returns to the office for 2-week postop check after undergoing an excisional lumpectomy x 2 of the left breast.  At the time of the surgery, she was found to have fairly significant dense breast tissue.  The 2 breast masses were removed without difficulty.  She only took the pain medication for the first 2 days, but started to have some GI upset secondary to the pain medication.  She did put ice on the area which helped with the swelling.  She has not noticed any recent drainage or erythema.  Very minimal pain.  Eating and drinking well without any nausea or vomiting.  No fevers.    Risk factors for breast cancer: 28-year-old premenopausal white female; menarche at age 13; no children; had a right core needle biopsy at age 14 with benign diagnosis; no first-degree relative with breast cancer. She has a maternal grandmother who had breast cancer in her late 30s. She had a paternal uncle who had prostate cancer. She did take birth control pills for about 14 years. Alanis score not applicable due to age.     ROS  Review of Systems   Constitutional: no fever, no chills, no recent weight gain and no recent weight loss.   Eyes: no eye symptoms, but no loss of vision, no discharge from the eyes, no itching of the eyes and no eye pain.   ENT: no hearing loss, no neck pain and no hoarseness.   Cardiovascular: no chest pain, no palpitations and no lower extremity edema.   Respiratory: no dyspnea, no dyspnea during exertion and no cough.   Breast: breast mass and pain in breast, but no nipple discharge, no erythema, no change in breast skin and no axillary adenopathy.   Gastrointestinal: no abdominal pain,  "no constipation, no heartburn, no vomiting, no blood in stools, bowel movement frequency normal.   Genitourinary: no dysuria and no hematuria.   Musculoskeletal: no arthralgias, no myalgias and no hernia.   Integumentary: no rashes and no skin lesions.   Neurological: no headache, no dizziness, no numbness, no tingling and no limb weakness.   Psychiatric: no anxiety, no depression and no emotional problems.   Endocrine: no heat or cold intolerance and no increased thirst.   Hematologic/Lymphatic: no tendency for easy bleeding and no tendency for easy bruising.   All other systems have been reviewed and are negative for complaint.     HISTORY   History reviewed. No pertinent past medical history.     Past Surgical History:   Procedure Laterality Date    BREAST BIOPSY      BREAST LUMPECTOMY Left 12/18/2023    excision of fibroadenoma x2        No Known Allergies     Social History     Tobacco Use   Smoking Status Never   Smokeless Tobacco Never        Social History     Substance and Sexual Activity   Alcohol Use Yes    Alcohol/week: 1.0 standard drink of alcohol    Types: 1 Glasses of wine per week        HOME MEDICATIONS  Current Outpatient Medications   Medication Instructions    Evangelina 0.25-35 mg-mcg tablet 1 tablet, oral, Daily    sodium fluoride-pot nitrate 1.1-5 % paste USE TWICE A DAY MORNING AND EVENING.          OBJECTIVE   Last Recorded Vitals.  Blood pressure 105/69, pulse 73, height 1.575 m (5' 2\"), weight 55.2 kg (121 lb 12.8 oz), last menstrual period 11/27/2023, SpO2 99 %.     PHYSICAL EXAM  Physical Exam   General: Well-developed, well-nourished and in no acute distress.  Head: Normocephalic. Atraumatic.  Neck/thyroid: Neck is supple.   Eyes: Pupils equal round and reactive to light. Conjunctiva normal.  ENMT: No masses or deformity of external nose. External ears without masses.  Respiratory/Chest: Normal respiratory effort.  Breast: Small, symmetrical breasts.  Significant fibrocystic changes and " dense breast tissue of both breasts.  No palpable masses of either breast.  Well-healed periareolar incision of the superior aspect of the left NAC.  No erythema and no drainage.  Lymphatics: No palpable lymphadenopathy of the cervical, supraclavicular or axillary regions.  Cardiovascular: Regular rate and rhythm.   Abdomen: Soft, nontender, nondistended.   Musculoskeletal: Joints and limbs are grossly normal. Normal gait. Normal range of motion of major joints.  Neuro: Oriented to person, place and time. No obvious neurological deficit. Motor strength grossly normal.  Psych: Normal mood and affect.     RESULTS   Pathology  FINAL DIAGNOSIS   A. Left breast, retroareolar mass, excision:  -- Fibroadenoma     B. Left breat, upper outer mass, excision:  -- Fibroadenoma      Electronically signed by Yuni Mohr MD on 12/26/2023 at 1439         ASSESSMENT / PLAN   Diagnoses and all orders for this visit:  Fibroadenoma of left breast  Dense breast tissue  Fibrocystic breast disease (FCBD), unspecified laterality      Plan  Feb 2023: LEFT: 4.6cm biopsy-proven fibroadenoma at the 10 o'clock position; second 1.7 cm left breast mass at the 12 o'clock position; s/p lumpectomy x2 with fibroadenomas    1.  The 2 fibroadenomas of the left breast have been removed.  Benign pathology.  No routine follow-up is needed at this time.  She should start her yearly screening mammograms at age 38 as she does not have any increased risk factors.  2.  Reviewed that with dense breast tissue, a self breast examination on a monthly basis is important.  If she identifies any abnormalities, she may contact the office for reevaluation.  3.  Otherwise, she is referred back to her primary care physician/gynecologist for all further medical care, but may be referred back to my office for any further surgical needs.      Rosemarie Vidal MD, FACS  [facility] General Surgery  [address];   [address]  [place]   [address] [phone]

## 2024-03-27 ENCOUNTER — APPOINTMENT (OUTPATIENT)
Dept: OBSTETRICS AND GYNECOLOGY | Facility: CLINIC | Age: 29
End: 2024-03-27
Payer: COMMERCIAL

## 2024-04-23 ENCOUNTER — APPOINTMENT (OUTPATIENT)
Dept: OBSTETRICS AND GYNECOLOGY | Facility: CLINIC | Age: 29
End: 2024-04-23
Payer: COMMERCIAL

## 2024-04-24 ENCOUNTER — OFFICE VISIT (OUTPATIENT)
Dept: OBSTETRICS AND GYNECOLOGY | Facility: CLINIC | Age: 29
End: 2024-04-24
Payer: COMMERCIAL

## 2024-04-24 VITALS
SYSTOLIC BLOOD PRESSURE: 110 MMHG | BODY MASS INDEX: 22.08 KG/M2 | DIASTOLIC BLOOD PRESSURE: 72 MMHG | WEIGHT: 120 LBS | HEIGHT: 62 IN

## 2024-04-24 DIAGNOSIS — Z01.419 WOMEN'S ANNUAL ROUTINE GYNECOLOGICAL EXAMINATION: Primary | ICD-10-CM

## 2024-04-24 DIAGNOSIS — N92.1 EXCESSIVE AND FREQUENT MENSTRUATION WITH IRREGULAR CYCLE: ICD-10-CM

## 2024-04-24 PROCEDURE — 99395 PREV VISIT EST AGE 18-39: CPT | Performed by: NURSE PRACTITIONER

## 2024-04-24 PROCEDURE — 1036F TOBACCO NON-USER: CPT | Performed by: NURSE PRACTITIONER

## 2024-04-24 RX ORDER — NORGESTIMATE AND ETHINYL ESTRADIOL 0.25-0.035
1 KIT ORAL DAILY
Qty: 84 TABLET | Refills: 3 | Status: SHIPPED | OUTPATIENT
Start: 2024-04-24

## 2024-04-24 ASSESSMENT — ENCOUNTER SYMPTOMS
NEUROLOGICAL NEGATIVE: 1
EYES NEGATIVE: 1
ENDOCRINE NEGATIVE: 1
RESPIRATORY NEGATIVE: 1
CONSTITUTIONAL NEGATIVE: 1
MUSCULOSKELETAL NEGATIVE: 1
CARDIOVASCULAR NEGATIVE: 1
PSYCHIATRIC NEGATIVE: 1
GASTROINTESTINAL NEGATIVE: 1

## 2024-04-24 NOTE — PROGRESS NOTES
Subjective   Patient ID: Princess Gutierrez is a 28 y.o. female who presents for Annual Exam (Patient states last year she started Nani birth control pills which work well for her, the last two times she picked up her rx it has been a generic which does not seen to have the same affect. Nani RICHY pended. /Normal PAP 2/8/2023/Left breast biopsy was done 2/24/2023).  28 year old here for annual exam with complaints of having a generic pill sent to the pharmacy.  She notes that she has had the generic pill not control her period as well and she does not like the way it makes her feel.  She had a normal pap in 2023 and is due again in 2026.  She denies any abnormal bleeding, pain, discharge, urinary changes and breast complaints.         Review of Systems   Constitutional: Negative.    HENT: Negative.     Eyes: Negative.    Respiratory: Negative.     Cardiovascular: Negative.    Gastrointestinal: Negative.    Endocrine: Negative.    Genitourinary: Negative.    Musculoskeletal: Negative.    Skin: Negative.    Neurological: Negative.    Psychiatric/Behavioral: Negative.         Objective   Physical Exam  Vitals reviewed.   Constitutional:       Appearance: Normal appearance. She is well-developed.   Pulmonary:      Effort: Pulmonary effort is normal. No respiratory distress.   Chest:   Breasts:     Breasts are symmetrical.      Right: Normal. No swelling, bleeding, inverted nipple, mass, nipple discharge, skin change or tenderness.      Left: Normal. No swelling, bleeding, inverted nipple, mass, nipple discharge, skin change or tenderness.   Abdominal:      Palpations: Abdomen is soft.   Genitourinary:     General: Normal vulva.      Exam position: Lithotomy position.      Pubic Area: No rash.       Labia:         Right: No rash, tenderness, lesion or injury.         Left: No rash, tenderness, lesion or injury.       Urethra: No prolapse, urethral pain, urethral swelling or urethral lesion.      Vagina: Normal.       Cervix: Normal.      Uterus: Normal.       Adnexa: Right adnexa normal and left adnexa normal.      Rectum: Normal.   Musculoskeletal:         General: Normal range of motion.   Lymphadenopathy:      Upper Body:      Right upper body: No supraclavicular, axillary or pectoral adenopathy.      Left upper body: No supraclavicular, axillary or pectoral adenopathy.   Skin:     General: Skin is warm and dry.   Neurological:      General: No focal deficit present.      Mental Status: She is alert and oriented to person, place, and time. Mental status is at baseline.   Psychiatric:         Attention and Perception: Attention and perception normal.         Mood and Affect: Mood and affect normal.         Speech: Speech normal.         Behavior: Behavior normal. Behavior is cooperative.         Thought Content: Thought content normal.         Judgment: Judgment normal.     Exam performed by Darryl RAJAN, 2nd exam performed by student Tamela GIRARD student [facility] upon patient agreement.      Assessment/Plan   Problem List Items Addressed This Visit             ICD-10-CM    Women's annual routine gynecological examination - Primary Z01.419     Other Visit Diagnoses         Codes    Excessive and frequent menstruation with irregular cycle     N92.1    Relevant Medications    Evangelina 0.25-35 mg-mcg tablet        Oral contraceptives were discussed.  The risks and benefits were presented to the patient including the risk for VTE's and an increased risk with smoking. Patient stated understanding and desires use of OCP.  Additional use of condoms encouraged to patient to prevent STI's.  Pap/hpv due 2026  Follow up 1 year or as needed          VINAY Herrmann 04/24/24 10:40 AM

## 2024-06-19 ENCOUNTER — APPOINTMENT (OUTPATIENT)
Dept: PRIMARY CARE | Facility: CLINIC | Age: 29
End: 2024-06-19
Payer: COMMERCIAL

## 2024-06-19 ENCOUNTER — LAB (OUTPATIENT)
Dept: LAB | Facility: LAB | Age: 29
End: 2024-06-19
Payer: COMMERCIAL

## 2024-06-19 VITALS
SYSTOLIC BLOOD PRESSURE: 110 MMHG | TEMPERATURE: 96.9 F | DIASTOLIC BLOOD PRESSURE: 78 MMHG | HEART RATE: 61 BPM | WEIGHT: 118.2 LBS | RESPIRATION RATE: 20 BRPM | BODY MASS INDEX: 21.75 KG/M2 | HEIGHT: 62 IN | OXYGEN SATURATION: 99 %

## 2024-06-19 DIAGNOSIS — Z00.00 HEALTHCARE MAINTENANCE: Primary | ICD-10-CM

## 2024-06-19 DIAGNOSIS — Z13.89 SCREENING FOR NEPHROPATHY: ICD-10-CM

## 2024-06-19 DIAGNOSIS — Z86.2 HISTORY OF ANEMIA: ICD-10-CM

## 2024-06-19 DIAGNOSIS — R00.0 TACHYCARDIA: ICD-10-CM

## 2024-06-19 DIAGNOSIS — Z13.220 SCREENING FOR HYPERLIPIDEMIA: ICD-10-CM

## 2024-06-19 DIAGNOSIS — Z13.29 SCREENING FOR THYROID DISORDER: ICD-10-CM

## 2024-06-19 LAB
25(OH)D3 SERPL-MCNC: 48 NG/ML (ref 30–100)
ALBUMIN SERPL BCP-MCNC: 4.2 G/DL (ref 3.4–5)
ALP SERPL-CCNC: 35 U/L (ref 33–110)
ALT SERPL W P-5'-P-CCNC: 7 U/L (ref 7–45)
ANION GAP SERPL CALC-SCNC: 11 MMOL/L (ref 10–20)
AST SERPL W P-5'-P-CCNC: 17 U/L (ref 9–39)
BILIRUB SERPL-MCNC: 0.7 MG/DL (ref 0–1.2)
BUN SERPL-MCNC: 13 MG/DL (ref 6–23)
CALCIUM SERPL-MCNC: 9.1 MG/DL (ref 8.6–10.3)
CHLORIDE SERPL-SCNC: 103 MMOL/L (ref 98–107)
CHOLEST SERPL-MCNC: 194 MG/DL (ref 0–199)
CHOLESTEROL/HDL RATIO: 4.8
CO2 SERPL-SCNC: 26 MMOL/L (ref 21–32)
CREAT SERPL-MCNC: 0.68 MG/DL (ref 0.5–1.05)
EGFRCR SERPLBLD CKD-EPI 2021: >90 ML/MIN/1.73M*2
ERYTHROCYTE [DISTWIDTH] IN BLOOD BY AUTOMATED COUNT: 11.9 % (ref 11.5–14.5)
FERRITIN SERPL-MCNC: 49 NG/ML (ref 8–150)
GLUCOSE SERPL-MCNC: 90 MG/DL (ref 74–99)
HCT VFR BLD AUTO: 41.9 % (ref 36–46)
HDLC SERPL-MCNC: 40.3 MG/DL
HGB BLD-MCNC: 13.9 G/DL (ref 12–16)
IRON SATN MFR SERPL: 57 % (ref 25–45)
IRON SERPL-MCNC: 253 UG/DL (ref 35–150)
LDLC SERPL CALC-MCNC: 130 MG/DL
MCH RBC QN AUTO: 31.4 PG (ref 26–34)
MCHC RBC AUTO-ENTMCNC: 33.2 G/DL (ref 32–36)
MCV RBC AUTO: 95 FL (ref 80–100)
NON HDL CHOLESTEROL: 154 MG/DL (ref 0–149)
NRBC BLD-RTO: 0 /100 WBCS (ref 0–0)
PLATELET # BLD AUTO: 246 X10*3/UL (ref 150–450)
POTASSIUM SERPL-SCNC: 4.2 MMOL/L (ref 3.5–5.3)
PROT SERPL-MCNC: 7.1 G/DL (ref 6.4–8.2)
RBC # BLD AUTO: 4.43 X10*6/UL (ref 4–5.2)
SODIUM SERPL-SCNC: 136 MMOL/L (ref 136–145)
TIBC SERPL-MCNC: 445 UG/DL (ref 240–445)
TRIGL SERPL-MCNC: 120 MG/DL (ref 0–149)
TSH SERPL-ACNC: 3.44 MIU/L (ref 0.44–3.98)
UIBC SERPL-MCNC: 192 UG/DL (ref 110–370)
VIT B12 SERPL-MCNC: 229 PG/ML (ref 211–911)
VLDL: 24 MG/DL (ref 0–40)
WBC # BLD AUTO: 7.6 X10*3/UL (ref 4.4–11.3)

## 2024-06-19 PROCEDURE — 84443 ASSAY THYROID STIM HORMONE: CPT

## 2024-06-19 PROCEDURE — 99385 PREV VISIT NEW AGE 18-39: CPT

## 2024-06-19 PROCEDURE — 85027 COMPLETE CBC AUTOMATED: CPT

## 2024-06-19 PROCEDURE — 1036F TOBACCO NON-USER: CPT

## 2024-06-19 PROCEDURE — 80061 LIPID PANEL: CPT

## 2024-06-19 PROCEDURE — 82607 VITAMIN B-12: CPT

## 2024-06-19 PROCEDURE — 82728 ASSAY OF FERRITIN: CPT

## 2024-06-19 PROCEDURE — 80053 COMPREHEN METABOLIC PANEL: CPT

## 2024-06-19 PROCEDURE — 82306 VITAMIN D 25 HYDROXY: CPT

## 2024-06-19 PROCEDURE — 90471 IMMUNIZATION ADMIN: CPT

## 2024-06-19 PROCEDURE — 83540 ASSAY OF IRON: CPT

## 2024-06-19 PROCEDURE — 90715 TDAP VACCINE 7 YRS/> IM: CPT

## 2024-06-19 PROCEDURE — 36415 COLL VENOUS BLD VENIPUNCTURE: CPT

## 2024-06-19 PROCEDURE — 99214 OFFICE O/P EST MOD 30 MIN: CPT

## 2024-06-19 PROCEDURE — 83550 IRON BINDING TEST: CPT

## 2024-06-19 SDOH — ECONOMIC STABILITY: FOOD INSECURITY: WITHIN THE PAST 12 MONTHS, YOU WORRIED THAT YOUR FOOD WOULD RUN OUT BEFORE YOU GOT MONEY TO BUY MORE.: NEVER TRUE

## 2024-06-19 SDOH — ECONOMIC STABILITY: FOOD INSECURITY: WITHIN THE PAST 12 MONTHS, THE FOOD YOU BOUGHT JUST DIDN'T LAST AND YOU DIDN'T HAVE MONEY TO GET MORE.: NEVER TRUE

## 2024-06-19 ASSESSMENT — ENCOUNTER SYMPTOMS
GASTROINTESTINAL NEGATIVE: 1
ENDOCRINE NEGATIVE: 1
PSYCHIATRIC NEGATIVE: 1
MUSCULOSKELETAL NEGATIVE: 1
OCCASIONAL FEELINGS OF UNSTEADINESS: 0
EYES NEGATIVE: 1
PALPITATIONS: 1
LIGHT-HEADEDNESS: 1
SHORTNESS OF BREATH: 1
LOSS OF SENSATION IN FEET: 0
DEPRESSION: 0
FATIGUE: 1
HEMATOLOGIC/LYMPHATIC NEGATIVE: 1

## 2024-06-19 ASSESSMENT — PAIN SCALES - GENERAL: PAINLEVEL: 0-NO PAIN

## 2024-06-19 ASSESSMENT — ANXIETY QUESTIONNAIRES
5. BEING SO RESTLESS THAT IT IS HARD TO SIT STILL: NOT AT ALL
1. FEELING NERVOUS, ANXIOUS, OR ON EDGE: NOT AT ALL
GAD7 TOTAL SCORE: 0
3. WORRYING TOO MUCH ABOUT DIFFERENT THINGS: NOT AT ALL
4. TROUBLE RELAXING: NOT AT ALL
IF YOU CHECKED OFF ANY PROBLEMS ON THIS QUESTIONNAIRE, HOW DIFFICULT HAVE THESE PROBLEMS MADE IT FOR YOU TO DO YOUR WORK, TAKE CARE OF THINGS AT HOME, OR GET ALONG WITH OTHER PEOPLE: NOT DIFFICULT AT ALL
6. BECOMING EASILY ANNOYED OR IRRITABLE: NOT AT ALL
7. FEELING AFRAID AS IF SOMETHING AWFUL MIGHT HAPPEN: NOT AT ALL
2. NOT BEING ABLE TO STOP OR CONTROL WORRYING: NOT AT ALL

## 2024-06-19 ASSESSMENT — PATIENT HEALTH QUESTIONNAIRE - PHQ9
1. LITTLE INTEREST OR PLEASURE IN DOING THINGS: NOT AT ALL
SUM OF ALL RESPONSES TO PHQ9 QUESTIONS 1 & 2: 0
2. FEELING DOWN, DEPRESSED OR HOPELESS: NOT AT ALL

## 2024-06-19 ASSESSMENT — LIFESTYLE VARIABLES
HOW MANY STANDARD DRINKS CONTAINING ALCOHOL DO YOU HAVE ON A TYPICAL DAY: PATIENT DOES NOT DRINK
AUDIT-C TOTAL SCORE: 0
SKIP TO QUESTIONS 9-10: 1
HOW OFTEN DO YOU HAVE A DRINK CONTAINING ALCOHOL: NEVER
HOW OFTEN DO YOU HAVE SIX OR MORE DRINKS ON ONE OCCASION: NEVER

## 2024-06-19 NOTE — ASSESSMENT & PLAN NOTE
- Healthy diet, good quality and duration of sleep, healthy water intake, regular exercise and healthy weight discussed  Vaccines   Flu: Recommended annually  Tdap: Recommended and given today  - GYN/PAP: Following with GYN annually Karen Chacon NP  -Following with dentistry and optometry regularly  -No symptoms of depression/concerns for anxiety  -Feeling safe at home  -Skin cancer prevention

## 2024-06-19 NOTE — PATIENT INSTRUCTIONS
Thank you for coming to see me today.  If you have any questions or concerns following our visit, please contact the office.  Phone: [phone]    Follow up with me in 3 months or sooner as needed    1)  Get fasting labwork in the next 1-2 weeks.  The lab is down the [place]right from our office.     2) Please schedule a echo cardiogram and zio patch placement - please call [phone] or schedule at  on your way out today     3) Increase your water intake- goal water intake is 64 oz per day

## 2024-06-19 NOTE — ASSESSMENT & PLAN NOTE
Endorses tachycardia with exercise, heart rates between 180-190bpm associated with lightheadedness/dizziness, shortness of breath and palpitations. Feels exertion of exercise not in proportion to heart rate    Check labs- TSH, BMP, iron studies, B12 (hx anemia)  Echo  Zio patch x 7 days

## 2024-06-19 NOTE — PROGRESS NOTES
Subjective   Patient ID: Princess Gutierrez is a 28 y.o. female who presents for CPE and concerns for elevated heart rate and hyperlipidemia.    Concerns for elevated heart rate up to the 190s when running or biking or walking on an incline on treadmill, gets lightheaded with it. Has had two instances where she has to stop and catch her breath. Has palpitations with these instances, first noticed 1-2 years ago.  Feels hot, dizzy and lightheaded. Thinks her father may have history of heart rhythm issues but doesn't speak with him much. Instances occurring with exercising, has not has outside of exercise.     Also concerns about high cholesterol seen on insurance screener recently.   Hasn't seen a primary care provider for about 10 years.     Diet: Overall diet pretty healthy,  is into triathalons, meal prep tends to be pasta, protein, fruits and veggies. 1 coke per week. Few additional sweets. Sometimes 1 cup coffee per day, no energy drinks  Exercise: Runs 5k's, goes to the gym 2-3 times per week, cardio and lifting for  60-90 minute each session.  Weight: Stable  Water: Drinking about 16-32oz per day, more if going to the gym  Sleep: Good sleep, still feeling tired in the morning irrespective of how much sleep she gets. No naps. Getting about 6-8 hours per night  Social: , lives in a house. 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   HENT: Negative.     Eyes: Negative.    Respiratory:  Positive for shortness of breath.    Cardiovascular:  Positive for palpitations.   Gastrointestinal: Negative.    Endocrine: Negative.    Genitourinary: Negative.    Musculoskeletal: Negative.    Skin: Negative.    Neurological:  Positive for light-headedness.   Hematological: Negative.    Psychiatric/Behavioral: Negative.          Current Outpatient Medications   Medication Sig Dispense Refill    Evangelina 0.25-35 mg-mcg tablet Take 1  "tablet by mouth once daily. 84 tablet 3    sodium fluoride-pot nitrate 1.1-5 % paste USE TWICE A DAY MORNING AND EVENING.       No current facility-administered medications for this visit.     Past Surgical History:   Procedure Laterality Date    BREAST BIOPSY      BREAST LUMPECTOMY Left 12/18/2023    excision of fibroadenoma x2    WISDOM TOOTH EXTRACTION  2012     No family history on file.   Social History     Tobacco Use    Smoking status: Never     Passive exposure: Never    Smokeless tobacco: Never   Vaping Use    Vaping status: Never Used   Substance Use Topics    Alcohol use: Yes     Alcohol/week: 1.0 standard drink of alcohol     Types: 1 Glasses of wine per week    Drug use: Never        Objective     Visit Vitals  /78 (BP Location: Right arm, Patient Position: Sitting, BP Cuff Size: Adult)   Pulse 61   Temp 36.1 °C (96.9 °F)   Resp 20   Ht 1.575 m (5' 2\")   Wt 53.6 kg (118 lb 3.2 oz)   SpO2 99%   BMI 21.62 kg/m²   OB Status Having periods   Smoking Status Never   BSA 1.53 m²        Physical Exam  Constitutional:       Appearance: Normal appearance.   HENT:      Head: Normocephalic and atraumatic.   Eyes:      Extraocular Movements: Extraocular movements intact.      Pupils: Pupils are equal, round, and reactive to light.   Cardiovascular:      Rate and Rhythm: Normal rate and regular rhythm.   Pulmonary:      Effort: Pulmonary effort is normal.      Breath sounds: Normal breath sounds.   Abdominal:      General: Abdomen is flat. Bowel sounds are normal.      Palpations: Abdomen is soft.   Musculoskeletal:         General: Normal range of motion.   Skin:     General: Skin is warm and dry.      Capillary Refill: Capillary refill takes less than 2 seconds.   Neurological:      General: No focal deficit present.      Mental Status: She is alert and oriented to person, place, and time.   Psychiatric:         Mood and Affect: Mood normal.         Behavior: Behavior normal.           Assessment/Plan "   Problem List Items Addressed This Visit       Healthcare maintenance - Primary     - Healthy diet, good quality and duration of sleep, healthy water intake, regular exercise and healthy weight discussed  Vaccines   Flu: Recommended annually  Tdap: Recommended and given today  - GYN/PAP: Following with GYN annually Karen Chacon NP  -Following with dentistry and optometry regularly  -No symptoms of depression/concerns for anxiety  -Feeling safe at home  -Skin cancer prevention          Tachycardia     Endorses tachycardia with exercise, heart rates between 180-190bpm associated with lightheadedness/dizziness, shortness of breath and palpitations. Feels exertion of exercise not in proportion to heart rate    Check labs- TSH, BMP, iron studies, B12 (hx anemia)  Echo  Zio patch x 7 days         Relevant Orders    Transthoracic Echo (TTE) Complete    Holter or Event Cardiac Monitor     Other Visit Diagnoses       History of anemia        Relevant Orders    CBC    Ferritin    Iron and TIBC    Vitamin B12    Screening for nephropathy        Relevant Orders    Comprehensive Metabolic Panel    Screening for hyperlipidemia        Relevant Orders    Lipid Panel    Screening for thyroid disorder        Relevant Orders    Vitamin D 25-Hydroxy,Total (for eval of Vitamin D levels)    TSH with reflex to Free T4 if abnormal            All pertinent lab work and results were reviewed with patient.     Follow up with me in 3 months     EMANI Carlson-CNS

## 2024-07-03 ENCOUNTER — HOSPITAL ENCOUNTER (OUTPATIENT)
Dept: CARDIOLOGY | Facility: HOSPITAL | Age: 29
Discharge: HOME | End: 2024-07-03
Payer: COMMERCIAL

## 2024-07-03 DIAGNOSIS — R00.0 TACHYCARDIA: ICD-10-CM

## 2024-07-03 LAB
AORTIC VALVE MEAN GRADIENT: 5 MMHG
AVA (VTI): 2.29 CM2
EJECTION FRACTION APICAL 4 CHAMBER: 72.5
EJECTION FRACTION: 70 %
LEFT VENTRICLE INTERNAL DIMENSION DIASTOLE: 4 CM (ref 3.5–6)
LEFT VENTRICULAR OUTFLOW TRACT DIAMETER: 2 CM
LV EJECTION FRACTION BIPLANE: 70 %
MITRAL VALVE E/A RATIO: 1.68
MITRAL VALVE E/E' RATIO: 8.71
RIGHT VENTRICLE FREE WALL PEAK S': 16.6 CM/S
RIGHT VENTRICLE PEAK SYSTOLIC PRESSURE: 28.4 MMHG
TRICUSPID ANNULAR PLANE SYSTOLIC EXCURSION: 2.7 CM

## 2024-07-03 PROCEDURE — 93306 TTE W/DOPPLER COMPLETE: CPT | Performed by: INTERNAL MEDICINE

## 2024-07-03 PROCEDURE — 93242 EXT ECG>48HR<7D RECORDING: CPT

## 2024-07-03 PROCEDURE — 93306 TTE W/DOPPLER COMPLETE: CPT

## 2024-07-21 DIAGNOSIS — R00.0 TACHYCARDIA: ICD-10-CM

## 2024-07-21 DIAGNOSIS — I47.10 SVT (SUPRAVENTRICULAR TACHYCARDIA) (CMS-HCC): Primary | ICD-10-CM

## 2024-07-22 ENCOUNTER — TELEPHONE (OUTPATIENT)
Dept: PRIMARY CARE | Facility: CLINIC | Age: 29
End: 2024-07-22
Payer: COMMERCIAL

## 2024-07-22 NOTE — TELEPHONE ENCOUNTER
Patient called in and was notified of message. Patient expressed verbal understanding and had no questions at this time.

## 2024-07-22 NOTE — TELEPHONE ENCOUNTER
----- Message from Jennifer Guadarrama sent at 7/21/2024 10:06 AM EDT -----  Regarding: Appt cancellation  I recommended follow up with me in September for heart rate issues however given cardiac testing I'm going to refer her to cardiology. Please call to let her know I can see her back in June of next year unless she really wants to see me in September. If OK with one year please cancel appt scheduled in September

## 2024-08-08 ENCOUNTER — APPOINTMENT (OUTPATIENT)
Dept: CARDIOLOGY | Facility: CLINIC | Age: 29
End: 2024-08-08
Payer: COMMERCIAL

## 2024-08-08 VITALS
HEIGHT: 62 IN | BODY MASS INDEX: 21.71 KG/M2 | SYSTOLIC BLOOD PRESSURE: 100 MMHG | WEIGHT: 118 LBS | HEART RATE: 59 BPM | DIASTOLIC BLOOD PRESSURE: 68 MMHG

## 2024-08-08 DIAGNOSIS — I47.10 SVT (SUPRAVENTRICULAR TACHYCARDIA) (CMS-HCC): ICD-10-CM

## 2024-08-08 DIAGNOSIS — R00.0 TACHYCARDIA: ICD-10-CM

## 2024-08-08 DIAGNOSIS — R06.09 DOE (DYSPNEA ON EXERTION): Primary | ICD-10-CM

## 2024-08-08 PROCEDURE — 1036F TOBACCO NON-USER: CPT | Performed by: INTERNAL MEDICINE

## 2024-08-08 PROCEDURE — 93000 ELECTROCARDIOGRAM COMPLETE: CPT | Performed by: INTERNAL MEDICINE

## 2024-08-08 PROCEDURE — 99204 OFFICE O/P NEW MOD 45 MIN: CPT | Performed by: INTERNAL MEDICINE

## 2024-08-08 PROCEDURE — 3008F BODY MASS INDEX DOCD: CPT | Performed by: INTERNAL MEDICINE

## 2024-08-08 NOTE — PROGRESS NOTES
"  [facility] Heart and Vascular Electrophysiology    Patient Name: Princess Gutierrez  Patient : 1995    Referred  for   Chief Complaint   Patient presents with    New Patient Visit        Princess Gutierrez is a 28 y.o. year old female patient with    Palpitations: started about a year ago. At rest her heart can feel like it starts racing. Sudden onset. Lasts about 10-15min. Her apple watch says her HR is in the 130bpm. Gradual termination. During exercise she notices her HR is a lot higher than before which limits her exercise tolerance. She is an avid runner/cyclist and is worried about exerting herself during these times.    Past Medical History:  She has a past medical history of Anemia ().    Past Surgical History:  She has a past surgical history that includes Breast biopsy; Breast lumpectomy (Left, 2023); and [place] tooth extraction ().      Social History:  She reports that she has never smoked. She has never been exposed to tobacco smoke. She has never used smokeless tobacco. She reports current alcohol use of about 1.0 standard drink of alcohol per week. She reports that she does not use drugs.    Family History:  No family history on file.     Allergies:  Patient has no known allergies.    Outpatient Medications:  Current Outpatient Medications   Medication Instructions    Evangelina 0.25-35 mg-mcg tablet 1 tablet, oral, Daily    sodium fluoride-pot nitrate 1.1-5 % paste USE TWICE A DAY MORNING AND EVENING.        ROS:  A 14 point review of systems was done and is negative other than as stated in HPI    Vitals:  Vitals:    24 1603   BP: 100/68   Pulse: 59   Weight: 53.5 kg (118 lb)   Height: 1.575 m (5' 2\")       Physical Exam:   Physical Exam  Cardiovascular:      Rate and Rhythm: Normal rate and regular rhythm.   Pulmonary:      Effort: Pulmonary effort is normal.   Musculoskeletal:         General: No swelling.   Skin:     Findings: No rash.   Neurological:      General: No focal " deficit present.      Mental Status: She is alert and oriented to person, place, and time.   Psychiatric:         Mood and Affect: Mood normal.          Intake/Output:   No intake/output data recorded.    Outpatient Medications  Current Outpatient Medications on File Prior to Visit   Medication Sig Dispense Refill    Evangelina 0.25-35 mg-mcg tablet Take 1 tablet by mouth once daily. 84 tablet 3    sodium fluoride-pot nitrate 1.1-5 % paste USE TWICE A DAY MORNING AND EVENING.       No current facility-administered medications on file prior to visit.       Labs: (past 26 weeks)  Recent Results (from the past 4368 hour(s))   CBC    Collection Time: 06/19/24  9:54 AM   Result Value Ref Range    WBC 7.6 4.4 - 11.3 x10*3/uL    nRBC 0.0 0.0 - 0.0 /100 WBCs    RBC 4.43 4.00 - 5.20 x10*6/uL    Hemoglobin 13.9 12.0 - 16.0 g/dL    Hematocrit 41.9 36.0 - 46.0 %    MCV 95 80 - 100 fL    MCH 31.4 26.0 - 34.0 pg    MCHC 33.2 32.0 - 36.0 g/dL    RDW 11.9 11.5 - 14.5 %    Platelets 246 150 - 450 x10*3/uL   Comprehensive Metabolic Panel    Collection Time: 06/19/24  9:54 AM   Result Value Ref Range    Glucose 90 74 - 99 mg/dL    Sodium 136 136 - 145 mmol/L    Potassium 4.2 3.5 - 5.3 mmol/L    Chloride 103 98 - 107 mmol/L    Bicarbonate 26 21 - 32 mmol/L    Anion Gap 11 10 - 20 mmol/L    Urea Nitrogen 13 6 - 23 mg/dL    Creatinine 0.68 0.50 - 1.05 mg/dL    eGFR >90 >60 mL/min/1.73m*2    Calcium 9.1 8.6 - 10.3 mg/dL    Albumin 4.2 3.4 - 5.0 g/dL    Alkaline Phosphatase 35 33 - 110 U/L    Total Protein 7.1 6.4 - 8.2 g/dL    AST 17 9 - 39 U/L    Bilirubin, Total 0.7 0.0 - 1.2 mg/dL    ALT 7 7 - 45 U/L   Lipid Panel    Collection Time: 06/19/24  9:54 AM   Result Value Ref Range    Cholesterol 194 0 - 199 mg/dL    HDL-Cholesterol 40.3 mg/dL    Cholesterol/HDL Ratio 4.8     LDL Calculated 130 (H) <=99 mg/dL    VLDL 24 0 - 40 mg/dL    Triglycerides 120 0 - 149 mg/dL    Non HDL Cholesterol 154 (H) 0 - 149 mg/dL   Ferritin    Collection Time:  06/19/24  9:54 AM   Result Value Ref Range    Ferritin 49 8 - 150 ng/mL   Iron and TIBC    Collection Time: 06/19/24  9:54 AM   Result Value Ref Range    Iron 253 (H) 35 - 150 ug/dL    UIBC 192 110 - 370 ug/dL    TIBC 445 240 - 445 ug/dL    % Saturation 57 (H) 25 - 45 %   Vitamin D 25-Hydroxy,Total (for eval of Vitamin D levels)    Collection Time: 06/19/24  9:54 AM   Result Value Ref Range    Vitamin D, 25-Hydroxy, Total 48 30 - 100 ng/mL   TSH with reflex to Free T4 if abnormal    Collection Time: 06/19/24  9:54 AM   Result Value Ref Range    Thyroid Stimulating Hormone 3.44 0.44 - 3.98 mIU/L   Vitamin B12    Collection Time: 06/19/24  9:54 AM   Result Value Ref Range    Vitamin B12 229 211 - 911 pg/mL   Transthoracic Echo (TTE) Complete    Collection Time: 07/03/24  9:35 AM   Result Value Ref Range    LVOT diam 2.00 cm    LV Biplane EF 70 %    MV E/A ratio 1.68     MV avg E/e' ratio 8.71     Tricuspid annular plane systolic excursion 2.7 cm    AV mn grad 5.0 mmHg    LV EF 70 %    RV free wall pk S' 16.60 cm/s    RVSP 28.4 mmHg    LVIDd 4.00 cm    Aortic Valve Area by Continuity of VTI 2.29 cm2    LV A4C EF 72.5        ECG  No results found for this or any previous visit (from the past 4464 hour(s)).    Echocardiogram  No results found for this or any previous visit from the past 1095 days.      Assessment/Plan:     Event monitor essentially benign. Some PAC's, sinus arrhythmia noted. Patient triggered events correlated with sinus tachycardia as well as sinus rhythm. Echo completely normal. I doubt patient is having SVT and does not appear to have any structural heart disease. She would like to go back to working out. Given her symptoms are mostly with exertion, will go ahead and order an exercise stress. Will call with results. Discussed briefly about possibly starting beta blockers but patient would rather not be on additional meds.

## 2024-09-04 ENCOUNTER — HOSPITAL ENCOUNTER (OUTPATIENT)
Dept: CARDIOLOGY | Facility: HOSPITAL | Age: 29
Discharge: HOME | End: 2024-09-04
Payer: COMMERCIAL

## 2024-09-04 DIAGNOSIS — R06.09 DOE (DYSPNEA ON EXERTION): ICD-10-CM

## 2024-09-04 PROCEDURE — 93016 CV STRESS TEST SUPVJ ONLY: CPT | Performed by: INTERNAL MEDICINE

## 2024-09-04 PROCEDURE — 93017 CV STRESS TEST TRACING ONLY: CPT

## 2024-09-04 PROCEDURE — 93018 CV STRESS TEST I&R ONLY: CPT | Performed by: INTERNAL MEDICINE

## 2024-09-25 ENCOUNTER — APPOINTMENT (OUTPATIENT)
Dept: PRIMARY CARE | Facility: CLINIC | Age: 29
End: 2024-09-25
Payer: COMMERCIAL

## 2024-09-25 VITALS
OXYGEN SATURATION: 98 % | SYSTOLIC BLOOD PRESSURE: 100 MMHG | WEIGHT: 118.1 LBS | HEIGHT: 62 IN | DIASTOLIC BLOOD PRESSURE: 68 MMHG | RESPIRATION RATE: 20 BRPM | TEMPERATURE: 96.9 F | HEART RATE: 62 BPM | BODY MASS INDEX: 21.73 KG/M2

## 2024-09-25 DIAGNOSIS — R00.0 TACHYCARDIA: Primary | ICD-10-CM

## 2024-09-25 PROCEDURE — 99213 OFFICE O/P EST LOW 20 MIN: CPT

## 2024-09-25 PROCEDURE — 3008F BODY MASS INDEX DOCD: CPT

## 2024-09-25 PROCEDURE — 1036F TOBACCO NON-USER: CPT

## 2024-09-25 RX ORDER — METOPROLOL SUCCINATE 25 MG/1
12.5 TABLET, EXTENDED RELEASE ORAL DAILY
Qty: 15 TABLET | Refills: 5 | Status: SHIPPED | OUTPATIENT
Start: 2024-09-25 | End: 2025-03-24

## 2024-09-25 SDOH — ECONOMIC STABILITY: FOOD INSECURITY: WITHIN THE PAST 12 MONTHS, THE FOOD YOU BOUGHT JUST DIDN'T LAST AND YOU DIDN'T HAVE MONEY TO GET MORE.: NEVER TRUE

## 2024-09-25 SDOH — ECONOMIC STABILITY: FOOD INSECURITY: WITHIN THE PAST 12 MONTHS, YOU WORRIED THAT YOUR FOOD WOULD RUN OUT BEFORE YOU GOT MONEY TO BUY MORE.: NEVER TRUE

## 2024-09-25 ASSESSMENT — LIFESTYLE VARIABLES
HOW OFTEN DO YOU HAVE A DRINK CONTAINING ALCOHOL: NEVER
AUDIT-C TOTAL SCORE: 0
HOW OFTEN DO YOU HAVE SIX OR MORE DRINKS ON ONE OCCASION: NEVER
HOW MANY STANDARD DRINKS CONTAINING ALCOHOL DO YOU HAVE ON A TYPICAL DAY: PATIENT DOES NOT DRINK
SKIP TO QUESTIONS 9-10: 1

## 2024-09-25 ASSESSMENT — PATIENT HEALTH QUESTIONNAIRE - PHQ9
2. FEELING DOWN, DEPRESSED OR HOPELESS: NOT AT ALL
SUM OF ALL RESPONSES TO PHQ9 QUESTIONS 1 & 2: 0
1. LITTLE INTEREST OR PLEASURE IN DOING THINGS: NOT AT ALL

## 2024-09-25 ASSESSMENT — ENCOUNTER SYMPTOMS
DEPRESSION: 0
EYES NEGATIVE: 1
OCCASIONAL FEELINGS OF UNSTEADINESS: 0
GASTROINTESTINAL NEGATIVE: 1
CARDIOVASCULAR NEGATIVE: 1
RESPIRATORY NEGATIVE: 1
PSYCHIATRIC NEGATIVE: 1
MUSCULOSKELETAL NEGATIVE: 1
ENDOCRINE NEGATIVE: 1
HEMATOLOGIC/LYMPHATIC NEGATIVE: 1
LOSS OF SENSATION IN FEET: 0
CONSTITUTIONAL NEGATIVE: 1
NEUROLOGICAL NEGATIVE: 1

## 2024-09-25 ASSESSMENT — PAIN SCALES - GENERAL: PAINLEVEL: 0-NO PAIN

## 2024-09-25 NOTE — PATIENT INSTRUCTIONS
Thank you for coming to see me today.  If you have any questions or concerns following our visit, please contact the office.  Phone: [phone]    Follow up with me in 3 months or sooner as needed    1)  START metoprolol succinate 12.5mg daily. If blood pressures are not low and heart rate remains elevated, OK to increase to a full tablet daily    2) Let me know if blood pressures are dropping less than 100 on top and you're lightheaded/dizzy/tired as I will want to make medication change to ivabradine

## 2024-09-25 NOTE — ASSESSMENT & PLAN NOTE
Continues to have heart rates up to 160-190 bpm with exercise associated with chest pain (likely due to rate-mediated ischemia) and shortness of breath when heart rate is fast  Was seen by Dr. Irwin in EP who recommended low dose beta-blocker which she wasn't interested in at the time  EKG stress test with tachycardia to 190s at max exertion    Trial metoprolol succinate 12.5mg daily   If symptomatic/hypotensive and without adequate rate reduction, can consider ivabradine 5mg daily and consult with cardiology

## 2024-09-25 NOTE — PROGRESS NOTES
Subjective   Patient ID: Princess Gutierrez is a 28 y.o. female who presents for follow up of tachycardia.    Was seen by Dr. Irwin in EP who reviewed her Zio patch and states there were PAC and tachycardia but no concerning findings for SVT. Was sent for exercise stress test which showed abnormally high heart rate. At the time wanted to avoid beta blocker. Continues working out.     Diet: Overall diet pretty healthy,  is into triathalons, meal prep tends to be pasta, protein, fruits and veggies. 1 coke per week. Few additional sweets. Sometimes 1 cup coffee per day, no energy drinks  Exercise: Runs 5k's, goes to the gym 2-3 times per week, cardio and lifting for  60-90 minute each session.  Weight: Stable  Water: Drinking about 16-32oz per day, more if going to the gym  Sleep: Good sleep, still feeling tired in the morning irrespective of how much sleep she gets. No naps. Getting about 6-8 hours per night  Social: , lives in a house. 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    HENT: Negative.     Eyes: Negative.    Respiratory: Negative.     Cardiovascular: Negative.    Gastrointestinal: Negative.    Endocrine: Negative.    Genitourinary: Negative.    Musculoskeletal: Negative.    Skin: Negative.    Neurological: Negative.    Hematological: Negative.    Psychiatric/Behavioral: Negative.          Current Outpatient Medications   Medication Sig Dispense Refill    Evangelina 0.25-35 mg-mcg tablet Take 1 tablet by mouth once daily. 84 tablet 3    metoprolol succinate XL (Toprol-XL) 25 mg 24 hr tablet Take 0.5 tablets (12.5 mg) by mouth once daily. Do not crush or chew. 15 tablet 5     No current facility-administered medications for this visit.     Past Surgical History:   Procedure Laterality Date    BREAST BIOPSY      BREAST LUMPECTOMY Left 12/18/2023    excision of fibroadenoma x2    WISDOM TOOTH EXTRACTION  2012     No family  "history on file.   Social History     Tobacco Use    Smoking status: Never     Passive exposure: Never    Smokeless tobacco: Never   Vaping Use    Vaping status: Never Used   Substance Use Topics    Alcohol use: Not Currently     Comment: socially    Drug use: Never        Objective     Visit Vitals  /68 (BP Location: Right arm, Patient Position: Sitting, BP Cuff Size: Adult)   Pulse 62   Temp 36.1 °C (96.9 °F)   Resp 20   Ht 1.575 m (5' 2\")   Wt 53.6 kg (118 lb 1.6 oz)   SpO2 98%   BMI 21.60 kg/m²   OB Status Having periods   Smoking Status Never   BSA 1.53 m²        Physical Exam  Constitutional:       Appearance: Normal appearance.   HENT:      Head: Normocephalic and atraumatic.   Eyes:      Extraocular Movements: Extraocular movements intact.      Pupils: Pupils are equal, round, and reactive to light.   Cardiovascular:      Rate and Rhythm: Normal rate and regular rhythm.   Pulmonary:      Effort: Pulmonary effort is normal.      Breath sounds: Normal breath sounds.   Abdominal:      General: Abdomen is flat. Bowel sounds are normal.      Palpations: Abdomen is soft.   Musculoskeletal:         General: Normal range of motion.   Skin:     General: Skin is warm and dry.      Capillary Refill: Capillary refill takes less than 2 seconds.   Neurological:      General: No focal deficit present.      Mental Status: She is alert and oriented to person, place, and time.   Psychiatric:         Mood and Affect: Mood normal.         Behavior: Behavior normal.           Assessment/Plan   Problem List Items Addressed This Visit       Tachycardia - Primary     Continues to have heart rates up to 160-190 bpm with exercise associated with chest pain (likely due to rate-mediated ischemia) and shortness of breath when heart rate is fast  Was seen by Dr. Irwin in EP who recommended low dose beta-blocker which she wasn't interested in at the time  EKG stress test with tachycardia to 190s at max exertion    Trial " metoprolol succinate 12.5mg daily   If symptomatic/hypotensive and without adequate rate reduction, can consider ivabradine 5mg daily and consult with cardiology         Relevant Medications    metoprolol succinate XL (Toprol-XL) 25 mg 24 hr tablet       All pertinent lab work and results were reviewed with patient.     Follow up with me in 3 months for HR    EMANI Carlson-CNS

## 2024-10-11 ENCOUNTER — TELEPHONE (OUTPATIENT)
Dept: PRIMARY CARE | Facility: CLINIC | Age: 29
End: 2024-10-11
Payer: COMMERCIAL

## 2024-10-11 NOTE — TELEPHONE ENCOUNTER
Pt is doing well on metoprolol 25 mg once a day and would like a refill sent for that dosage not the half a pill

## 2024-12-30 ENCOUNTER — APPOINTMENT (OUTPATIENT)
Dept: PRIMARY CARE | Facility: CLINIC | Age: 29
End: 2024-12-30
Payer: COMMERCIAL

## 2024-12-30 VITALS
BODY MASS INDEX: 22.45 KG/M2 | HEIGHT: 62 IN | WEIGHT: 122 LBS | OXYGEN SATURATION: 97 % | HEART RATE: 60 BPM | RESPIRATION RATE: 16 BRPM | SYSTOLIC BLOOD PRESSURE: 106 MMHG | DIASTOLIC BLOOD PRESSURE: 70 MMHG | TEMPERATURE: 96 F

## 2024-12-30 DIAGNOSIS — K64.4 EXTERNAL HEMORRHOID: ICD-10-CM

## 2024-12-30 DIAGNOSIS — Z13.29 SCREENING FOR THYROID DISORDER: ICD-10-CM

## 2024-12-30 DIAGNOSIS — E53.8 B12 DEFICIENCY: ICD-10-CM

## 2024-12-30 DIAGNOSIS — Z13.89 SCREENING FOR NEPHROPATHY: ICD-10-CM

## 2024-12-30 DIAGNOSIS — R00.0 TACHYCARDIA: Primary | ICD-10-CM

## 2024-12-30 DIAGNOSIS — E78.2 MIXED HYPERLIPIDEMIA: ICD-10-CM

## 2024-12-30 DIAGNOSIS — Z13.21 ENCOUNTER FOR VITAMIN DEFICIENCY SCREENING: ICD-10-CM

## 2024-12-30 PROCEDURE — 1036F TOBACCO NON-USER: CPT

## 2024-12-30 PROCEDURE — 3008F BODY MASS INDEX DOCD: CPT

## 2024-12-30 PROCEDURE — 99213 OFFICE O/P EST LOW 20 MIN: CPT

## 2024-12-30 SDOH — ECONOMIC STABILITY: FOOD INSECURITY: WITHIN THE PAST 12 MONTHS, THE FOOD YOU BOUGHT JUST DIDN'T LAST AND YOU DIDN'T HAVE MONEY TO GET MORE.: NEVER TRUE

## 2024-12-30 SDOH — ECONOMIC STABILITY: FOOD INSECURITY: WITHIN THE PAST 12 MONTHS, YOU WORRIED THAT YOUR FOOD WOULD RUN OUT BEFORE YOU GOT MONEY TO BUY MORE.: NEVER TRUE

## 2024-12-30 ASSESSMENT — LIFESTYLE VARIABLES
SKIP TO QUESTIONS 9-10: 1
HOW OFTEN DO YOU HAVE A DRINK CONTAINING ALCOHOL: NEVER
AUDIT-C TOTAL SCORE: 0
HOW OFTEN DO YOU HAVE SIX OR MORE DRINKS ON ONE OCCASION: NEVER
HOW MANY STANDARD DRINKS CONTAINING ALCOHOL DO YOU HAVE ON A TYPICAL DAY: PATIENT DOES NOT DRINK

## 2024-12-30 ASSESSMENT — PATIENT HEALTH QUESTIONNAIRE - PHQ9
2. FEELING DOWN, DEPRESSED OR HOPELESS: NOT AT ALL
1. LITTLE INTEREST OR PLEASURE IN DOING THINGS: NOT AT ALL
2. FEELING DOWN, DEPRESSED OR HOPELESS: NOT AT ALL
1. LITTLE INTEREST OR PLEASURE IN DOING THINGS: NOT AT ALL
SUM OF ALL RESPONSES TO PHQ9 QUESTIONS 1 & 2: 0
SUM OF ALL RESPONSES TO PHQ9 QUESTIONS 1 AND 2: 0

## 2024-12-30 ASSESSMENT — COLUMBIA-SUICIDE SEVERITY RATING SCALE - C-SSRS
2. HAVE YOU ACTUALLY HAD ANY THOUGHTS OF KILLING YOURSELF?: NO
1. IN THE PAST MONTH, HAVE YOU WISHED YOU WERE DEAD OR WISHED YOU COULD GO TO SLEEP AND NOT WAKE UP?: NO
6. HAVE YOU EVER DONE ANYTHING, STARTED TO DO ANYTHING, OR PREPARED TO DO ANYTHING TO END YOUR LIFE?: NO

## 2024-12-30 ASSESSMENT — ENCOUNTER SYMPTOMS
CARDIOVASCULAR NEGATIVE: 1
EYES NEGATIVE: 1
HEMATOLOGIC/LYMPHATIC NEGATIVE: 1
CONSTITUTIONAL NEGATIVE: 1
DEPRESSION: 0
OCCASIONAL FEELINGS OF UNSTEADINESS: 0
MUSCULOSKELETAL NEGATIVE: 1
RESPIRATORY NEGATIVE: 1
GASTROINTESTINAL NEGATIVE: 1
NEUROLOGICAL NEGATIVE: 1
PSYCHIATRIC NEGATIVE: 1
ENDOCRINE NEGATIVE: 1
LOSS OF SENSATION IN FEET: 0

## 2024-12-30 NOTE — PATIENT INSTRUCTIONS
Thank you for coming to see me today.  If you have any questions or concerns following our visit, please contact the office.  Phone: [phone]    Follow up with me in June 2025 for annual physical    1)  START B12 500mcg daily or every other day to help with low B12 symptoms    2) I recommend mediterranean diet based eating pattern to help lower cholesterol.     3) I am referring you to Dr. Grazyna Olivares in general surgery for hemorrhoid evaluation - please call [phone] to schedule an appointment or schedule at  on your way out     4) Get fasting labwork a few days prior to next visit.  The lab is down the wright from our office.

## 2024-12-30 NOTE — ASSESSMENT & PLAN NOTE
Lipid panel form 6/2024 with , LDL-C 130, tri's 120  Unsure of family history, reports her diet is pretty healthy    Recommended mediterranean diet eating pattern  Repeat fasting labs prior to next visit in 6 months

## 2024-12-30 NOTE — PROGRESS NOTES
Subjective   Patient ID: Princess Gutierrez is a 29 y.o. female who presents for follow up of tachycardia.    Started on metoprolol succinate 25mg daily on 10/15. Doing well with this. Heart rate controlled in office today at 60 bpm.     States she had blood work which showed high cholesterol. Discussed mediterranean diet.    Concerns about external hemorrhoids- has tried suppositories and creams with no effect.     Diet: Overall diet pretty healthy,  is into triathalons, meal prep tends to be pasta, protein, fruits and veggies. 1 coke per week. Few additional sweets. Sometimes 1 cup coffee per day, no energy drinks  Exercise: Runs 5k's, goes to the gym 2-3 times per week, cardio and lifting for  60-90 minute each session.  Weight: Stable  Water: Drinking about 16-32oz per day, more if going to the gym  Sleep: Good sleep, still feeling tired in the morning irrespective of how much sleep she gets. No naps. Getting about 6-8 hours per night  Social: , lives in a house. 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    HENT: Negative.     Eyes: Negative.    Respiratory: Negative.     Cardiovascular: Negative.    Gastrointestinal: Negative.    Endocrine: Negative.    Genitourinary: Negative.    Musculoskeletal: Negative.    Skin: Negative.    Neurological: Negative.    Hematological: Negative.    Psychiatric/Behavioral: Negative.          Current Outpatient Medications   Medication Sig Dispense Refill    metoprolol succinate XL (Toprol-XL) 25 mg 24 hr tablet Take 1 tablet (25 mg) by mouth once daily. Do not crush or chew. 90 tablet 1    Evangelina 0.25-35 mg-mcg tablet Take 1 tablet by mouth once daily. 84 tablet 3     No current facility-administered medications for this visit.     Past Surgical History:   Procedure Laterality Date    BREAST BIOPSY      BREAST LUMPECTOMY Left 12/18/2023    excision of fibroadenoma x2    WISDOM TOOTH  "EXTRACTION  2012     No family history on file.   Social History     Tobacco Use    Smoking status: Never     Passive exposure: Never    Smokeless tobacco: Never   Vaping Use    Vaping status: Never Used   Substance Use Topics    Alcohol use: Not Currently     Comment: socially    Drug use: Never        Objective     Visit Vitals  /70 (BP Location: Right arm, Patient Position: Sitting, BP Cuff Size: Small adult)   Pulse 60   Temp 35.6 °C (96 °F) (Temporal)   Resp 16   Ht 1.575 m (5' 2\")   Wt 55.3 kg (122 lb)   SpO2 97%   BMI 22.31 kg/m²   OB Status Having periods   Smoking Status Never   BSA 1.56 m²        Physical Exam      Assessment/Plan   Problem List Items Addressed This Visit       Tachycardia - Primary     Doing well from clinical standpoint. Resting HR's in 50-60s, has not noticed any HR's in 40s per smart watch.  Symptoms have resolved    Continue metoprolol XL 25mg daily         Mixed hyperlipidemia     Lipid panel form 6/2024 with , LDL-C 130, tri's 120  Unsure of family history, reports her diet is pretty healthy    Recommended mediterranean diet eating pattern  Repeat fasting labs prior to next visit in 6 months         Relevant Orders    Lipid Panel     Other Visit Diagnoses       External hemorrhoid        Concerns with hemorrhoid- OTC preparations have not helped  Ref to gen surg Dr. Olivares  Adv positioning stool, fiber supplement, exercise, hydration    Relevant Orders    Referral to General Surgery    B12 deficiency        Relevant Orders    CBC    Vitamin B12    Screening for nephropathy        Relevant Orders    Comprehensive Metabolic Panel    Screening for thyroid disorder        Relevant Orders    TSH with reflex to Free T4 if abnormal    Encounter for vitamin deficiency screening        Relevant Orders    Vitamin D 25-Hydroxy,Total (for eval of Vitamin D levels)            All pertinent lab work and results were reviewed with patient.     Follow up with me in June for RISHI LACEY" Chiara, APRN-CNS

## 2024-12-30 NOTE — ASSESSMENT & PLAN NOTE
Doing well from clinical standpoint. Resting HR's in 50-60s, has not noticed any HR's in 40s per smart watch.  Symptoms have resolved    Continue metoprolol XL 25mg daily

## 2025-01-07 ENCOUNTER — TELEPHONE (OUTPATIENT)
Dept: SURGERY | Facility: CLINIC | Age: 30
End: 2025-01-07
Payer: COMMERCIAL

## 2025-01-07 NOTE — TELEPHONE ENCOUNTER
Spoke with patient because she was on Dr. Olivares schedule inadvertently.  Patient would like to stay with you for the hemorrhoids.  However, she has since noticed a new mass in her left breast.  Patient states that had a sharp pain weeks before she noticed the mass.  She was scheduled to have her hemorrhoids check out originally.  There is no appointment available until 1/22/25 with you.

## 2025-01-08 ENCOUNTER — APPOINTMENT (OUTPATIENT)
Dept: SURGERY | Facility: CLINIC | Age: 30
End: 2025-01-08
Payer: COMMERCIAL

## 2025-01-22 ENCOUNTER — APPOINTMENT (OUTPATIENT)
Dept: SURGERY | Facility: CLINIC | Age: 30
End: 2025-01-22
Payer: COMMERCIAL

## 2025-01-22 VITALS
DIASTOLIC BLOOD PRESSURE: 74 MMHG | BODY MASS INDEX: 22.93 KG/M2 | HEART RATE: 74 BPM | OXYGEN SATURATION: 100 % | SYSTOLIC BLOOD PRESSURE: 112 MMHG | HEIGHT: 62 IN | WEIGHT: 124.6 LBS

## 2025-01-22 DIAGNOSIS — N60.19 FIBROCYSTIC BREAST DISEASE (FCBD), UNSPECIFIED LATERALITY: ICD-10-CM

## 2025-01-22 DIAGNOSIS — R92.30 DENSE BREAST TISSUE: ICD-10-CM

## 2025-01-22 DIAGNOSIS — K62.89 RECTAL MASS: Primary | ICD-10-CM

## 2025-01-22 DIAGNOSIS — N63.25 MASS OVERLAPPING MULTIPLE QUADRANTS OF LEFT BREAST: ICD-10-CM

## 2025-01-22 PROCEDURE — 1036F TOBACCO NON-USER: CPT | Performed by: SURGERY

## 2025-01-22 PROCEDURE — 99214 OFFICE O/P EST MOD 30 MIN: CPT | Performed by: SURGERY

## 2025-01-22 PROCEDURE — 3008F BODY MASS INDEX DOCD: CPT | Performed by: SURGERY

## 2025-01-22 NOTE — PATIENT INSTRUCTIONS
"1.  To better evaluate the \"palpable mass\" of the left breast, a breast ultrasound will be performed.  Dr. Vidal will call you with these results.  2.  There is a palpable mass on your rectal exam.  Will evaluate this with a pelvic CT.  Dr. Vidal will call you with these results.  If there is no other significant finding, surgery to remove this area can be discussed.  "

## 2025-01-22 NOTE — PROGRESS NOTES
"    GENERAL SURGERY OFFICE NOTE    Patient: Princess Gutierrez    Age: 29 y.o.   Gender: female    MRN: [id number]    PCP: EMANI Carlson-MARYAM        SUBJECTIVE     Chief Complaint  Follow-up (Patient is here to follow up on her left breast mass. Patient reports having a left breast mass that has been there for almost a month. Patient reports getting shooting and sharp pains in that area around the time she noticed the lump. Patient is also here to follow up on her external hemorrhoids. She reports that have become external 4-5 months ago, they were internal and have some flare ups but nothing to often. Patient denies pain with bowel movements or bleeding through the day. )       LUANN Sánchez is a 29-year-old white female who is known to this office from a previous excision of fibroadenoma x 2 of the left breast.  She returns to the office today with 2 complaints.  The first issue is that of a palpable left breast mass.  She has known dense breast tissue.  Since the excision of her 2 previous fibroadenomas, she has had occasional left breast pain, but this is usually self resolving.  About a month ago, she started to notice a \"mass\" just below the NAC of her left breast at the 6 o'clock position.  This is mildly tender, but no overlying skin changes.  No nipple discharge.    Her other bigger concern is of \"hemorrhoids\".  She states that she has had intermittent \"flareups\" over several years which would include swelling in the rectal area and some occasional blood on the tissue paper after a bowel movement.  This usually resolves after 1 to 2 days without intervention.  However, over the last 4 to 5 months, she has noticed a lump in the anal region.  She has some discomfort with bowel movements, but does note that her bowel movements are occasionally hard and she does occasionally have to strain with her bowel movements.  She has tried multiple over-the-counter creams and suppositories but has not had any " improvement.  No previous history of any anal issues or surgery.  No other systemic symptoms.    Risk factors for breast cancer: 29-year-old premenopausal white female; menarche at age 13; no children; had a right core needle biopsy at age 14 with benign diagnosis; no first-degree relative with breast cancer. She has a maternal grandmother who had breast cancer in her late 30s. She had a paternal uncle who had prostate cancer. She did take birth control pills for about 14 years. Alanis score not applicable due to age.       ROS  Review of Systems   Constitutional: no fever, no chills, no recent weight gain and no recent weight loss.   Eyes: no eye symptoms, but no loss of vision, no discharge from the eyes, no itching of the eyes and no eye pain.   ENT: no hearing loss, no neck pain and no hoarseness.   Cardiovascular: no chest pain, no palpitations and no lower extremity edema.   Respiratory: no dyspnea, no dyspnea during exertion and no cough.   Breast: breast mass and pain in breast, but no nipple discharge, no erythema, no change in breast skin and no axillary adenopathy.   Gastrointestinal: no abdominal pain, no constipation, no heartburn, no vomiting, no blood in stools, bowel movement frequency normal.   Genitourinary: no dysuria and no hematuria.   Musculoskeletal: no arthralgias, no myalgias and no hernia.   Integumentary: no rashes and no skin lesions.   Neurological: no headache, no dizziness, no numbness, no tingling and no limb weakness.   Psychiatric: no anxiety, no depression and no emotional problems.   Endocrine: no heat or cold intolerance and no increased thirst.   Hematologic/Lymphatic: no tendency for easy bleeding and no tendency for easy bruising.   All other systems have been reviewed and are negative for complaint.     HISTORY     Past Medical History:   Diagnosis Date    Anemia 2015        Past Surgical History:   Procedure Laterality Date    BREAST BIOPSY      BREAST LUMPECTOMY Left  "12/18/2023    excision of fibroadenoma x2    WISDOM TOOTH EXTRACTION  2012        No Known Allergies     Social History     Tobacco Use   Smoking Status Never    Passive exposure: Never   Smokeless Tobacco Never        Social History     Substance and Sexual Activity   Alcohol Use Yes    Alcohol/week: 1.0 standard drink of alcohol    Types: 1 Glasses of wine per week    Comment: socially        HOME MEDICATIONS  Current Outpatient Medications   Medication Instructions    metoprolol succinate XL (TOPROL-XL) 25 mg, oral, Daily, Do not crush or chew.    Evangelina 0.25-35 mg-mcg tablet 1 tablet, oral, Daily          OBJECTIVE   Last Recorded Vitals.  Blood pressure 112/74, pulse 74, height 1.575 m (5' 2\"), weight 56.5 kg (124 lb 9.6 oz), SpO2 100%.     PHYSICAL EXAM  Physical Exam   General: Well-developed, well-nourished and in no acute distress.  Head: Normocephalic. Atraumatic.  Neck/thyroid: Neck is supple.   Eyes: Pupils equal round and reactive to light. Conjunctiva normal.  ENMT: No masses or deformity of external nose. External ears without masses.  Respiratory/Chest: Normal respiratory effort.  Breast: Small, symmetrical breasts.  Significant fibrocystic changes and dense breast tissue of both breasts.  No palpable mass of the right breast.  Well-healed periareolar incision of the superior aspect of the left NAC.  No erythema and no drainage.  At the 6 o'clock position just below the NAC of the left breast, there is some increased dense breast tissue and an area measuring 3 x 3 cm without definitive mass.  No overlying skin changes.  Lymphatics: No palpable lymphadenopathy of the cervical, supraclavicular or axillary regions.  Cardiovascular: Regular rate and rhythm.   Abdomen: Soft, nontender, nondistended.  Rectal: No evidence of any pilonidal cyst.  No obvious fistula, fissure or perirectal abscess.  She has 3 tiny anal skin tags which are nonindurated and nontender.  On digital exam, anal sphincter tone is " intact.  Along the right rectal wall approximately 3 cm proximal to the anal os, there is a 2 x 1.5 cm palpable nodule.   no active bleeding.  Musculoskeletal: Joints and limbs are grossly normal. Normal gait. Normal range of motion of major joints.  Neuro: Oriented to person, place and time. No obvious neurological deficit. Motor strength grossly normal.  Psych: Normal mood and affect.     RESULTS   Pathology  FINAL DIAGNOSIS   A. Left breast, retroareolar mass, excision:  -- Fibroadenoma     B. Left breat, upper outer mass, excision:  -- Fibroadenoma      Electronically signed by Yuni Mohr MD on 12/26/2023 at 1439         ASSESSMENT / PLAN   Diagnoses and all orders for this visit:  Rectal mass  -     CT pelvis w IV contrast; Future  Mass overlapping multiple quadrants of left breast  -     BI US breast limited left; Future  Dense breast tissue  Fibrocystic breast disease (FCBD), unspecified laterality        Plan  Feb 2023: LEFT: 4.6cm biopsy-proven fibroadenoma at the 10 o'clock position; second 1.7 cm left breast mass at the 12 o'clock position; s/p lumpectomy x2 with fibroadenomas    1.  Reviewed that with dense breast tissue, a self breast examination on a monthly basis is important.  If she identifies any abnormalities, she may contact the office for reevaluation.  2.  The area in the left breast most likely represents dense breast tissue.  However, she has had a history of fibroadenomas in the past.  Since this area appears different to her than previous, we will get an ultrasound of the left breast to check for any underlying mass.  Can call the patient with these results.  3.  On her exam there appears to be a palpable right rectal wall mass.  This most likely represents a thrombosed internal hemorrhoid.  Will need to do CT of the pelvis to rule out any other underlying etiology.  Will call the patient with the CT results.  If the CT suggest only a thrombosed internal hemorrhoid, can take to the  operating room for an excision of the hemorrhoid under general anesthesia.  The benefits and risk of an excisional hemorrhoidectomy have been reviewed with the patient.  Risk included, but not limited to, infection, bleeding, injury to surrounding organs, possible need for other procedure, nonresolution of all symptoms, recurrence of the hemorrhoids, allergic reaction to medication and even death.  Can further discuss surgery with her when she is called with the CT results.      Rosemarie Vidal MD, FACS  [facility]  [address] [place] [address];   [facility]; [address]  [place] [id number] [phone]

## 2025-01-30 ENCOUNTER — HOSPITAL ENCOUNTER (OUTPATIENT)
Dept: RADIOLOGY | Facility: HOSPITAL | Age: 30
Discharge: HOME | End: 2025-01-30
Payer: COMMERCIAL

## 2025-01-30 DIAGNOSIS — N63.25 MASS OVERLAPPING MULTIPLE QUADRANTS OF LEFT BREAST: ICD-10-CM

## 2025-01-30 PROCEDURE — 76642 ULTRASOUND BREAST LIMITED: CPT | Mod: LT

## 2025-01-30 PROCEDURE — 76982 USE 1ST TARGET LESION: CPT | Mod: LT

## 2025-02-06 ENCOUNTER — TELEPHONE (OUTPATIENT)
Dept: SURGERY | Facility: CLINIC | Age: 30
End: 2025-02-06
Payer: COMMERCIAL

## 2025-02-06 DIAGNOSIS — N63.20 MASS OF LEFT BREAST, UNSPECIFIED QUADRANT: Primary | ICD-10-CM

## 2025-02-06 NOTE — TELEPHONE ENCOUNTER
----- Message from Rosemarie Vidal sent at 2/6/2025  9:56 AM EST -----  1.  Please call the patient and let her know that her left breast ultrasound of the mass does not appear to be cancer.  However, would recommend a 6-month follow-up ultrasound.  Please schedule the left breast ultrasound in 6 months.  2.  Make office appointment after the left breast ultrasound.  3.  May cancel the left breast mammogram that was ordered.

## 2025-02-12 ENCOUNTER — HOSPITAL ENCOUNTER (OUTPATIENT)
Dept: RADIOLOGY | Facility: HOSPITAL | Age: 30
Discharge: HOME | End: 2025-02-12
Payer: COMMERCIAL

## 2025-02-12 DIAGNOSIS — K62.89 RECTAL MASS: ICD-10-CM

## 2025-02-12 PROCEDURE — 72193 CT PELVIS W/DYE: CPT

## 2025-02-12 PROCEDURE — 2550000001 HC RX 255 CONTRASTS: Performed by: SURGERY

## 2025-02-12 RX ORDER — DIATRIZOATE MEGLUMINE AND DIATRIZOATE SODIUM 660; 100 MG/ML; MG/ML
30 SOLUTION ORAL; RECTAL ONCE
Status: COMPLETED | OUTPATIENT
Start: 2025-02-12 | End: 2025-02-12

## 2025-02-12 RX ADMIN — IOHEXOL 75 ML: 350 INJECTION, SOLUTION INTRAVENOUS at 09:42

## 2025-02-12 RX ADMIN — DIATRIZOATE MEGLUMINE AND DIATRIZOATE SODIUM 30 ML: 660; 100 LIQUID ORAL; RECTAL at 09:42

## 2025-02-25 ENCOUNTER — TELEPHONE (OUTPATIENT)
Dept: SURGERY | Facility: HOSPITAL | Age: 30
End: 2025-02-25
Payer: COMMERCIAL

## 2025-02-25 NOTE — TELEPHONE ENCOUNTER
----- Message from Bailey Chacon sent at 2/25/2025  2:14 PM EST -----  Regarding: RE: no show  Absolutely! She has an appt scheduled for April for her annual exam, we will address this then as long as she comes to her appointment.   Thank you for reaching outKaren  ----- Message -----  From: Rosemarie Vidal MD  Sent: 2/25/2025   2:09 PM EST  To: EMANI Carlson-CNS; #  Subject: no show                                          She was supposed to follow-up in my office after a 6-month follow-up ultrasound for her breast issues.  She failed to contact my office after we called her several times.  We will be sending her a letter.  However, if she does show up at your office, if you could please schedule her for a follow-up breast ultrasound, even if she does not want to follow-up in my office.  If you have any other questions, please feel free to call my office.  [phone]

## 2025-02-26 ENCOUNTER — TELEPHONE (OUTPATIENT)
Dept: SURGERY | Facility: CLINIC | Age: 30
End: 2025-02-26
Payer: COMMERCIAL

## 2025-02-26 NOTE — TELEPHONE ENCOUNTER
----- Message from Rosemarie Vidal sent at 2/25/2025  2:05 PM EST -----  Regarding: RE: 6-month breast ultrasound  Yes. Please, send letter. I will notify her PCP.  ----- Message -----  From: Nadeem Lipscomb MA  Sent: 2/25/2025  11:37 AM EST  To: Rosemarie Vidal MD  Subject: FW: 6-month breast ultrasound                    Left 3rd message for patient to call the office. Would you like me to send the patient a letter?  ----- Message -----  From: Nadeem Lipscomb MA  Sent: 2/21/2025  12:01 PM EST  To: Nadeem Lipscomb MA  Subject: FW: 6-month breast ultrasound                    Left message for patient to call the office. Sent the patient a [employer] message as well.  ----- Message -----  From: Nadeem Lipscomb MA  Sent: 2/20/2025   9:38 AM EST  To: Nadeem Lipscomb MA  Subject: FW: 6-month breast ultrasound                    Left message for patient to call the office.  ----- Message -----  From: Rosemarie Vidal MD  Sent: 2/19/2025   5:51 PM EST  To: Nadeem Lipscomb MA  Subject: 6-month breast ultrasound                        Please see the note dated 1/30/2025 regarding scheduling of a breast ultrasound in 6 months with an office appointment after the ultrasound.  I do not believe this has been scheduled yet.

## 2025-03-10 ENCOUNTER — TELEPHONE (OUTPATIENT)
Dept: SURGERY | Facility: HOSPITAL | Age: 30
End: 2025-03-10
Payer: COMMERCIAL

## 2025-03-10 NOTE — TELEPHONE ENCOUNTER
No answer again. Left message. Will check rectal mass at her 6 month follow up appointment of breast issues.      ----- Message from Rosemarie Vidal sent at 2/27/2025  9:41 AM EST -----  Regarding: RE: Schedule surgery  I tried calling the patient back.  Had to leave message on voicemail.  Encouraged her to call the office to let us know if she wanted to proceed with the rectal exam under anesthesia with biopsy, or if she is just following up in 6 months for both the breast issue and rectal mass.  ----- Message -----  From: Rosemarie Vidal MD  Sent: 2/26/2025   8:00 AM EST  To: Rosemarie Vidal MD  Subject: RE: Schedule surgery                             1.  I talked with the patient regarding the CT of the abdomen/pelvis.  There is not a definitive mass by CT, but given the firm nodule on physical exam, would recommend a rectal exam under anesthesia and biopsy of this area.  She needs to talk with work about taking the time off for recovery.  Will call the patient back in 1 week to discuss timing of scheduling surgery.  2.  Also reviewed the breast ultrasound findings which are most consistent with fibrocystic breast disease.  She will need a repeat breast ultrasound in 6 months with a follow-up in the office after the ultrasound.  ----- Message -----  From: Rosemarie Vidal MD  Sent: 2/18/2025   4:03 PM EST  To: Rosemarie Vidal MD  Subject: Schedule surgery                                 Tried to call the patient to review the CT results.  Had to leave voice message.  CT does not show anything concerning radiographically from a colorectal standpoint.  However, would still recommend an exam under anesthesia and excision of the hard nodule felt on rectal exam.  The patient was encouraged to call the office to further discuss.

## 2025-03-19 ENCOUNTER — APPOINTMENT (OUTPATIENT)
Dept: SURGERY | Facility: CLINIC | Age: 30
End: 2025-03-19
Payer: COMMERCIAL

## 2025-03-19 VITALS
BODY MASS INDEX: 22.67 KG/M2 | HEART RATE: 71 BPM | SYSTOLIC BLOOD PRESSURE: 114 MMHG | DIASTOLIC BLOOD PRESSURE: 77 MMHG | OXYGEN SATURATION: 98 % | HEIGHT: 62 IN | WEIGHT: 123.2 LBS

## 2025-03-19 DIAGNOSIS — K62.89 RECTAL MASS: ICD-10-CM

## 2025-03-19 DIAGNOSIS — R92.30 DENSE BREAST TISSUE: ICD-10-CM

## 2025-03-19 DIAGNOSIS — N60.19 FIBROCYSTIC BREAST DISEASE (FCBD), UNSPECIFIED LATERALITY: Primary | ICD-10-CM

## 2025-03-19 PROCEDURE — 1036F TOBACCO NON-USER: CPT | Performed by: SURGERY

## 2025-03-19 PROCEDURE — 99213 OFFICE O/P EST LOW 20 MIN: CPT | Performed by: SURGERY

## 2025-03-19 PROCEDURE — 3008F BODY MASS INDEX DOCD: CPT | Performed by: SURGERY

## 2025-03-19 NOTE — PROGRESS NOTES
"    GENERAL SURGERY OFFICE NOTE    Patient: Princess Gutierrez    Age: 29 y.o.   Gender: female    MRN: [id number]    PCP: Jennifer Guadarrama, EMANI-MARYAM        SUBJECTIVE     Chief Complaint  Follow-up (Patient is here for a follow up rectal mass. Patient states no new or worsening symptoms. )       LUANN Sánchez returns to the office for follow-up of the \"rectal mass\" identified by physical exam previously.  She did subsequently undergo a CT of the abdomen/pelvis which did not identify any abnormalities.  She has not noticed any further bleeding per rectum.  She did feel that the rectal bleeding likely was secondary to aggressive cleaning of the perianal region due to the anal skin tags.    She had previously undergone and excision of fibroadenoma x 2 of the left breast. The first issue was that of a palpable left breast mass.  She has known dense breast tissue.  Since the excision of her 2 previous fibroadenomas, she has had occasional left breast pain, but this is usually self resolving.  About a month ago, she started to notice a \"mass\" just below the NAC of her left breast at the 6 o'clock position.  This is mildly tender, but no overlying skin changes.  No nipple discharge.  These areas were evaluated with a left breast ultrasound which just showed benign tissue and a complex cystic lesion.    Her other bigger concern is of \"hemorrhoids\".  She states that she has had intermittent \"flareups\" over several years which would include swelling in the rectal area and some occasional blood on the tissue paper after a bowel movement.  This usually resolves after 1 to 2 days without intervention.  However, over the last 4 to 5 months, she has noticed a lump in the anal region.  She has some discomfort with bowel movements, but does note that her bowel movements are occasionally hard and she does occasionally have to strain with her bowel movements.  She has tried multiple over-the-counter creams and suppositories but has not " had any improvement.  No previous history of any anal issues or surgery.  No other systemic symptoms.    Risk factors for breast cancer: 29-year-old premenopausal white female; menarche at age 13; no children; had a right core needle biopsy at age 14 with benign diagnosis; no first-degree relative with breast cancer. She has a maternal grandmother who had breast cancer in her late 30s. She had a paternal uncle who had prostate cancer. She did take birth control pills for about 14 years. Alanis score not applicable due to age.       ROS  Review of Systems   Constitutional: no fever, no chills, no recent weight gain and no recent weight loss.   Eyes: no eye symptoms, but no loss of vision, no discharge from the eyes, no itching of the eyes and no eye pain.   ENT: no hearing loss, no neck pain and no hoarseness.   Cardiovascular: no chest pain, no palpitations and no lower extremity edema.   Respiratory: no dyspnea, no dyspnea during exertion and no cough.   Breast: breast mass and pain in breast, but no nipple discharge, no erythema, no change in breast skin and no axillary adenopathy.   Gastrointestinal: no abdominal pain, no constipation, no heartburn, no vomiting, no blood in stools, bowel movement frequency normal.   Genitourinary: no dysuria and no hematuria.   Musculoskeletal: no arthralgias, no myalgias and no hernia.   Integumentary: no rashes and no skin lesions.   Neurological: no headache, no dizziness, no numbness, no tingling and no limb weakness.   Psychiatric: no anxiety, no depression and no emotional problems.   Endocrine: no heat or cold intolerance and no increased thirst.   Hematologic/Lymphatic: no tendency for easy bleeding and no tendency for easy bruising.   All other systems have been reviewed and are negative for complaint.     HISTORY     Past Medical History:   Diagnosis Date    Anemia 2015    Rectal mass 01/22/2025        Past Surgical History:   Procedure Laterality Date    BREAST BIOPSY    "   BREAST LUMPECTOMY Left 12/18/2023    excision of fibroadenoma x2    WISDOM TOOTH EXTRACTION  2012        No Known Allergies     Social History     Tobacco Use   Smoking Status Never    Passive exposure: Never   Smokeless Tobacco Never        Social History     Substance and Sexual Activity   Alcohol Use Yes    Alcohol/week: 1.0 standard drink of alcohol    Types: 1 Glasses of wine per week    Comment: socially        HOME MEDICATIONS  Current Outpatient Medications   Medication Instructions    metoprolol succinate XL (TOPROL-XL) 25 mg, oral, Daily, Do not crush or chew.    Evangelina 0.25-35 mg-mcg tablet 1 tablet, oral, Daily          OBJECTIVE   Last Recorded Vitals.  Blood pressure 114/77, pulse 71, height 1.575 m (5' 2\"), weight 55.9 kg (123 lb 3.2 oz), SpO2 98%.     PHYSICAL EXAM  Physical Exam   General: Well-developed, well-nourished and in no acute distress.  Head: Normocephalic. Atraumatic.  Neck/thyroid: Neck is supple.   Eyes: Pupils equal round and reactive to light. Conjunctiva normal.  ENMT: No masses or deformity of external nose. External ears without masses.  Respiratory/Chest: Normal respiratory effort.  Breast: Small, symmetrical breasts.  Significant fibrocystic changes and dense breast tissue of both breasts.  No palpable mass of the right breast.  Well-healed periareolar incision of the superior aspect of the left NAC.  No erythema and no drainage.  At the 6 o'clock position just below the NAC of the left breast, there is some increased dense breast tissue and an area measuring 3 x 3 cm without definitive mass.  No overlying skin changes.  Lymphatics: No palpable lymphadenopathy of the cervical, supraclavicular or axillary regions.  Cardiovascular: Regular rate and rhythm.   Abdomen: Soft, nontender, nondistended.  Rectal: No evidence of any pilonidal cyst.  No obvious fistula, fissure or perirectal abscess.  She has 3 tiny anal skin tags which are nonindurated and nontender.  On digital exam, " anal sphincter tone is intact.  The previous palpable nodule along the right rectal wall is not palpable on today's exam.  Musculoskeletal: Joints and limbs are grossly normal. Normal gait. Normal range of motion of major joints.  Neuro: Oriented to person, place and time. No obvious neurological deficit. Motor strength grossly normal.  Psych: Normal mood and affect.     RESULTS   Pathology  FINAL DIAGNOSIS   A. Left breast, retroareolar mass, excision:  -- Fibroadenoma     B. Left breat, upper outer mass, excision:  -- Fibroadenoma      Electronically signed by Yuni Mohr MD on 12/26/2023 at 1439       Radiology  US breast limited left  Status: Final result     PACS Images     Show images for BI US breast limited left  Signed by    Signed Time Phone Pager   Tripp Mcdermott MD 1/30/2025 13:51 [phone]     Exam Information    Status Exam Begun Exam Ended   Final 1/30/2025 11:28 1/30/2025 12:16     Study Result    Narrative & Impression   Interpreted By:  Tripp Mcdermott,   STUDY:  BI US BREAST LIMITED LEFT; ;  1/30/2025 12:16 pm      INDICATION:  Signs/Symptoms:left breast mass -6 o'clock position, 3 cm from the  nipple. Personal medical history of fibroadenomas.      ,N63.25 Unspecified lump in the left breast, overlapping quadrants      COMPARISON:  02/16/2023 mammogram      ACCESSION NUMBER(S):  [id number]      ORDERING CLINICIAN:  CARA WALTON      TECHNIQUE:  Left breast focused ultrasound and elastography were performed.      FINDINGS:  The background echotexture is homogeneous fibroglandular  corresponding to the in 2023 mammogram.      7 o'clock 3 cm from the left nipple in the area of the palpable  finding is an oval parallel hypoechoic probably benign mass versus  complex cyst, 4 x 7 x 7 mm. This is soft on elastography      Left axillary imaging demonstrates a normal lymph node.      IMPRESSION:  The left breast palpable finding corresponds to a probably benign  mass versus complex cyst.  Recommend follow-up left breast ultrasound  in 6 months.          MACRO:  BI-RADS Category:  3 Probably Benign.  Recommendation:  Short-term Interval Follow-up Imaging.  Recommended Date:  6 Months.  Laterality:  Left.      Signed by: Tripp Mcdermott 1/30/2025 1:51 PM  Dictation workstation:   [id number]     CT pelvis w IV contrast  Status: Final result     PACS Images     Show images for CT pelvis w IV contrast  Signed by    Signed Time Phone Pager   Steve Peñaloza DO 2/13/2025 18:26 [phone]     Exam Information    Status Exam Begun Exam Ended   Final 2/12/2025 09:02 2/12/2025 09:47     Study Result    Narrative & Impression   Interpreted By:  Steve Peñaloza,   STUDY:  CT PELVIS W IV CONTRAST;  2/12/2025 9:47 am      INDICATION:  Signs/Symptoms:Right rectal wall mass.      ,K62.89 Other specified diseases of anus and rectum      COMPARISON:  None.      ACCESSION NUMBER(S):  [id number]      ORDERING CLINICIAN:  CARA WALTON      TECHNIQUE:  CT of the pelvis was performed.  Standard contiguous axial images  were obtained at 3 mm slice thickness through pelvis. Coronal and  sagittal reconstructions at 3 mm slice thickness were performed.  75  ML of Omnipaque 350 was administered intravenously without immediate  complication.  30 mL rectal contrast was also given via rectal  catheter.      FINDINGS:  Administered rectal contrast has progressed to the ascending colon  near the cecum. No definite rectal mass is observed. Mildly tortuous  and redundant distal colon. There is some foci of stool particularly  within the transverse and ascending colon which somewhat limits  evaluation.      There are some mildly prominent fluid-filled small bowel loops  scattered throughout overall nonspecific. Appendix is not definitely  seen. Mild prominent lobulated heterogeneous appearance of the uterus  of uncertain significance. Small free pelvic fluid. Some visualized  small bowel loops are poorly distended/collapsed  otherwise limiting  evaluation. Mildly prominent nonspecific mesenteric nodes partially  imaged. Otherwise no definite suspicious adenopathy.      Mild nonspecific urinary bladder wall thickening.      No suspicious osseous lesions.      IMPRESSION:  1.  No definite CT evidence for distinct rectal mass. Correlate  clinically and follow-up as indicated. Correlation with MRI may be  considered for further assessment as clinically warranted.  2. Mildly prominent lobulated heterogeneous appearance of the uterus  of uncertain significance. Small free pelvic fluid which may be  physiologic. Correlation with dedicated pelvic ultrasound may be  considered for further assessment.  3. Mild nonspecific urinary bladder wall thickening.  4. Additional findings as above.      MACRO:  None      Signed by: Steve Peñaloza 2/13/2025 6:26 PM  Dictation workstation:   [id number]           ASSESSMENT / PLAN   Diagnoses and all orders for this visit:  Fibrocystic breast disease (FCBD), unspecified laterality  Dense breast tissue  Rectal mass          Plan  Feb 2023: LEFT: 4.6cm biopsy-proven fibroadenoma at the 10 o'clock position; second 1.7 cm left breast mass at the 12 o'clock position; s/p lumpectomy x2 with fibroadenomas    1.  Reviewed that with dense breast tissue, a self breast examination on a monthly basis is important.  If she identifies any abnormalities, she may contact the office for reevaluation.  2.  The area in the left breast most likely represents dense breast tissue.  However, she has had a history of fibroadenomas in the past.  Since this area appears different to her than previous, a left breast ultrasound was performed.  The ultrasound suggested a benign mass versus a complex cyst and a 6-month left breast ultrasound was recommended and ordered for August 2025.  She will return to the office after this ultrasound.  3.  On her previous exam there appeared to be a palpable right rectal wall mass.  This most likely  represented a thrombosed internal hemorrhoid.  A CT of the abdomen/pelvis was performed which did not identify any rectal abnormality.  On today's reexamination, the palpable abnormality has resolved.  Will plan for rectal exam in 6 months when she returns to the office after her left breast ultrasound to see if there is any recurrence of the rectal mass.  The rectal bleeding that she was experiencing previously is more likely associated with aggressive cleaning after bowel movements causing irritation of the anal skin tags.      Rosemarie Vidal MD, FACS  [facility] Surgery  [address] [place] [address];   [address]  [place], [place]  [id number] [phone]

## 2025-03-19 NOTE — PATIENT INSTRUCTIONS
"1.  The \"palpable mass\"  annual rectal exam previously is not evident on today's exam.  Will just continue to monitor this area and plan for rectal exam in 6 months when you return to the office regarding your \"breast issues\".  2.  A left breast ultrasound is scheduled for August 2025.  Return to the office after this ultrasound.  3.  Continue doing your monthly self breast exams.  If you identify any abnormalities, please call Dr. Vidal's office.  [phone]  "

## 2025-04-14 DIAGNOSIS — R00.0 TACHYCARDIA: ICD-10-CM

## 2025-04-16 RX ORDER — METOPROLOL SUCCINATE 25 MG/1
25 TABLET, EXTENDED RELEASE ORAL DAILY
Qty: 90 TABLET | Refills: 1 | Status: SHIPPED | OUTPATIENT
Start: 2025-04-16

## 2025-04-30 ENCOUNTER — APPOINTMENT (OUTPATIENT)
Dept: OBSTETRICS AND GYNECOLOGY | Facility: CLINIC | Age: 30
End: 2025-04-30
Payer: COMMERCIAL

## 2025-04-30 VITALS
BODY MASS INDEX: 22.26 KG/M2 | SYSTOLIC BLOOD PRESSURE: 102 MMHG | WEIGHT: 121 LBS | DIASTOLIC BLOOD PRESSURE: 70 MMHG | HEIGHT: 62 IN

## 2025-04-30 DIAGNOSIS — N92.1 EXCESSIVE AND FREQUENT MENSTRUATION WITH IRREGULAR CYCLE: ICD-10-CM

## 2025-04-30 DIAGNOSIS — Z01.419 WOMEN'S ANNUAL ROUTINE GYNECOLOGICAL EXAMINATION: Primary | ICD-10-CM

## 2025-04-30 PROCEDURE — 1036F TOBACCO NON-USER: CPT | Performed by: NURSE PRACTITIONER

## 2025-04-30 PROCEDURE — 99395 PREV VISIT EST AGE 18-39: CPT | Performed by: NURSE PRACTITIONER

## 2025-04-30 PROCEDURE — 3008F BODY MASS INDEX DOCD: CPT | Performed by: NURSE PRACTITIONER

## 2025-04-30 ASSESSMENT — ENCOUNTER SYMPTOMS
GASTROINTESTINAL NEGATIVE: 1
RESPIRATORY NEGATIVE: 1
CONSTITUTIONAL NEGATIVE: 1
PSYCHIATRIC NEGATIVE: 1

## 2025-04-30 NOTE — PROGRESS NOTES
Subjective   Patient ID: Princess Gutierrez is a 29 y.o. female who presents for Annual Exam (Normal PAP 2/8/2023/Mammogram 1/30/2025).  29 year old here for annual exam with desire for pregnancy and heavy menses.  She is currently using birth control to help with heavy periods.  She had a CT scan for a rectal mass but they noted possible uterine fibroids on the scan.  She denies any ultrasound for confirmation of this.  She notes that she has heavy bleeding without the birth control.  She is thinking of stopping birth control soon as they desire to try to conceive.  She had a normal pap in 2023 and is due again in 2026.  She denies any need for std testing.       Review of Systems   Constitutional: Negative.    Respiratory: Negative.     Gastrointestinal: Negative.    Genitourinary:  Positive for menstrual problem.   Skin: Negative.    Psychiatric/Behavioral: Negative.         Objective   Physical Exam  Vitals reviewed.   Constitutional:       Appearance: Normal appearance. She is well-developed.   Pulmonary:      Effort: Pulmonary effort is normal. No respiratory distress.   Chest:   Breasts:     Breasts are symmetrical.      Right: Normal. No swelling, bleeding, inverted nipple, mass, nipple discharge, skin change or tenderness.      Left: Normal. No swelling, bleeding, inverted nipple, mass, nipple discharge, skin change or tenderness.   Abdominal:      Palpations: Abdomen is soft.   Genitourinary:     General: Normal vulva.      Exam position: Lithotomy position.      Pubic Area: No rash.       Labia:         Right: No rash, tenderness, lesion or injury.         Left: No rash, tenderness, lesion or injury.       Urethra: No prolapse, urethral pain, urethral swelling or urethral lesion.      Vagina: Normal.      Cervix: Normal.      Uterus: Normal.       Adnexa: Right adnexa normal and left adnexa normal.      Rectum: Normal.   Musculoskeletal:         General: Normal range of motion.   Lymphadenopathy:      Upper  Body:      Right upper body: No supraclavicular, axillary or pectoral adenopathy.      Left upper body: No supraclavicular, axillary or pectoral adenopathy.   Skin:     General: Skin is warm and dry.   Neurological:      General: No focal deficit present.      Mental Status: She is alert and oriented to person, place, and time. Mental status is at baseline.   Psychiatric:         Attention and Perception: Attention and perception normal.         Mood and Affect: Mood and affect normal.         Speech: Speech normal.         Behavior: Behavior normal. Behavior is cooperative.         Thought Content: Thought content normal.         Judgment: Judgment normal.       Assessment/Plan   Problem List Items Addressed This Visit           ICD-10-CM    Women's annual routine gynecological examination - Primary Z01.419     Other Visit Diagnoses         Codes      Excessive and frequent menstruation with irregular cycle     N92.1    Relevant Orders    US PELVIS TRANSABDOMINAL WITH TRANSVAGINAL        Pap due 2026  Ultrasound ordered to monitor for fibroids, heavy menses  Encouraged to stop ocp 3 months prior to trying to conceive and to start prenatal  Follow up 1 year for annual exam, sooner as needed if problems arise         EMANI Herrmann-CNP 04/30/25 10:21 AM

## 2025-05-14 ENCOUNTER — HOSPITAL ENCOUNTER (OUTPATIENT)
Dept: RADIOLOGY | Facility: CLINIC | Age: 30
Discharge: HOME | End: 2025-05-14
Payer: COMMERCIAL

## 2025-05-14 DIAGNOSIS — N92.1 EXCESSIVE AND FREQUENT MENSTRUATION WITH IRREGULAR CYCLE: ICD-10-CM

## 2025-05-14 PROCEDURE — 76856 US EXAM PELVIC COMPLETE: CPT | Performed by: STUDENT IN AN ORGANIZED HEALTH CARE EDUCATION/TRAINING PROGRAM

## 2025-05-14 PROCEDURE — 76856 US EXAM PELVIC COMPLETE: CPT

## 2025-05-14 PROCEDURE — 76830 TRANSVAGINAL US NON-OB: CPT | Performed by: STUDENT IN AN ORGANIZED HEALTH CARE EDUCATION/TRAINING PROGRAM

## 2025-08-07 ENCOUNTER — APPOINTMENT (OUTPATIENT)
Dept: RADIOLOGY | Facility: HOSPITAL | Age: 30
End: 2025-08-07
Payer: COMMERCIAL

## 2025-08-13 ENCOUNTER — APPOINTMENT (OUTPATIENT)
Dept: SURGERY | Facility: CLINIC | Age: 30
End: 2025-08-13
Payer: COMMERCIAL

## 2026-05-06 ENCOUNTER — APPOINTMENT (OUTPATIENT)
Dept: OBSTETRICS AND GYNECOLOGY | Facility: CLINIC | Age: 31
End: 2026-05-06
Payer: COMMERCIAL

## (undated) DEVICE — PAD, GROUNDING, ELECTROSURGICAL, W/9 FT CABLE, POLYHESIVE II, ADULT, LF

## (undated) DEVICE — SUTURE, VICRYL, 0, 36 IN, CT-1, UNDYED

## (undated) DEVICE — Device

## (undated) DEVICE — GLOVE, PROTEXIS PI CLASSIC, SZ-7.0, PF, LF

## (undated) DEVICE — SUTURE, VICRYL, 4-0, 18 IN, UNDYED BR PS-2

## (undated) DEVICE — COVER HANDLE LIGHT, STERIS, BLUE, STERILE

## (undated) DEVICE — APPLICATOR, CHLORAPREP, W/ORANGE TINT, 26ML

## (undated) DEVICE — SYRINGE, 10 CC, LUER LOCK

## (undated) DEVICE — DRAPE, SHEET, MINOR PROCEDURE, T, PEDIATRIC, 100X122X77

## (undated) DEVICE — SUTURE, VICRYL, 2-0, 36 IN, CT-1, UNDYED